# Patient Record
Sex: MALE | Race: WHITE | NOT HISPANIC OR LATINO
[De-identification: names, ages, dates, MRNs, and addresses within clinical notes are randomized per-mention and may not be internally consistent; named-entity substitution may affect disease eponyms.]

---

## 2017-01-17 ENCOUNTER — APPOINTMENT (OUTPATIENT)
Dept: UROLOGY | Facility: CLINIC | Age: 57
End: 2017-01-17

## 2017-01-27 ENCOUNTER — TRANSCRIPTION ENCOUNTER (OUTPATIENT)
Age: 57
End: 2017-01-27

## 2017-02-03 ENCOUNTER — RECORD ABSTRACTING (OUTPATIENT)
Age: 57
End: 2017-02-03

## 2017-02-03 ENCOUNTER — TRANSCRIPTION ENCOUNTER (OUTPATIENT)
Age: 57
End: 2017-02-03

## 2017-02-07 ENCOUNTER — RECORD ABSTRACTING (OUTPATIENT)
Age: 57
End: 2017-02-07

## 2017-02-07 ENCOUNTER — TRANSCRIPTION ENCOUNTER (OUTPATIENT)
Age: 57
End: 2017-02-07

## 2017-03-04 ENCOUNTER — TRANSCRIPTION ENCOUNTER (OUTPATIENT)
Age: 57
End: 2017-03-04

## 2017-03-07 ENCOUNTER — TRANSCRIPTION ENCOUNTER (OUTPATIENT)
Age: 57
End: 2017-03-07

## 2017-03-11 ENCOUNTER — TRANSCRIPTION ENCOUNTER (OUTPATIENT)
Age: 57
End: 2017-03-11

## 2017-03-13 ENCOUNTER — TRANSCRIPTION ENCOUNTER (OUTPATIENT)
Age: 57
End: 2017-03-13

## 2017-03-30 ENCOUNTER — TRANSCRIPTION ENCOUNTER (OUTPATIENT)
Age: 57
End: 2017-03-30

## 2017-03-30 ENCOUNTER — MEDICATION RENEWAL (OUTPATIENT)
Age: 57
End: 2017-03-30

## 2017-05-09 ENCOUNTER — TRANSCRIPTION ENCOUNTER (OUTPATIENT)
Age: 57
End: 2017-05-09

## 2017-05-09 ENCOUNTER — MEDICATION RENEWAL (OUTPATIENT)
Age: 57
End: 2017-05-09

## 2017-05-17 ENCOUNTER — TRANSCRIPTION ENCOUNTER (OUTPATIENT)
Age: 57
End: 2017-05-17

## 2017-06-16 ENCOUNTER — APPOINTMENT (OUTPATIENT)
Dept: NEUROLOGY | Facility: CLINIC | Age: 57
End: 2017-06-16

## 2017-06-16 VITALS
DIASTOLIC BLOOD PRESSURE: 78 MMHG | WEIGHT: 189 LBS | SYSTOLIC BLOOD PRESSURE: 122 MMHG | HEIGHT: 69 IN | BODY MASS INDEX: 27.99 KG/M2 | HEART RATE: 76 BPM

## 2017-06-16 DIAGNOSIS — R42 DIZZINESS AND GIDDINESS: ICD-10-CM

## 2017-07-03 ENCOUNTER — TRANSCRIPTION ENCOUNTER (OUTPATIENT)
Age: 57
End: 2017-07-03

## 2017-07-05 ENCOUNTER — TRANSCRIPTION ENCOUNTER (OUTPATIENT)
Age: 57
End: 2017-07-05

## 2017-07-12 LAB
ALBUMIN SERPL ELPH-MCNC: 4.6 G/DL
ALP BLD-CCNC: 76 U/L
ALT SERPL-CCNC: 26 U/L
ANION GAP SERPL CALC-SCNC: 17 MMOL/L
AST SERPL-CCNC: 21 U/L
BASOPHILS # BLD AUTO: 0.05 K/UL
BASOPHILS NFR BLD AUTO: 0.9 %
BILIRUB SERPL-MCNC: 0.8 MG/DL
BUN SERPL-MCNC: 21 MG/DL
CALCIUM SERPL-MCNC: 10.2 MG/DL
CHLORIDE SERPL-SCNC: 97 MMOL/L
CO2 SERPL-SCNC: 28 MMOL/L
CREAT SERPL-MCNC: 1.25 MG/DL
EOSINOPHIL # BLD AUTO: 0.09 K/UL
EOSINOPHIL NFR BLD AUTO: 1.6 %
GLUCOSE SERPL-MCNC: 124 MG/DL
HCT VFR BLD CALC: 51.1 %
HGB BLD-MCNC: 16.8 G/DL
IMM GRANULOCYTES NFR BLD AUTO: 0.7 %
LYMPHOCYTES # BLD AUTO: 1.34 K/UL
LYMPHOCYTES NFR BLD AUTO: 23.6 %
MAN DIFF?: NORMAL
MCHC RBC-ENTMCNC: 30.1 PG
MCHC RBC-ENTMCNC: 32.9 GM/DL
MCV RBC AUTO: 91.6 FL
MONOCYTES # BLD AUTO: 0.37 K/UL
MONOCYTES NFR BLD AUTO: 6.5 %
NEUTROPHILS # BLD AUTO: 3.8 K/UL
NEUTROPHILS NFR BLD AUTO: 66.7 %
PLATELET # BLD AUTO: 177 K/UL
POTASSIUM SERPL-SCNC: 4.5 MMOL/L
PROT SERPL-MCNC: 8.1 G/DL
RBC # BLD: 5.58 M/UL
RBC # FLD: 13.8 %
SODIUM SERPL-SCNC: 142 MMOL/L
WBC # FLD AUTO: 5.69 K/UL

## 2017-07-13 LAB
PSA SERPL-MCNC: 5.84 NG/ML
TESTOST SERPL-MCNC: 319.5 NG/DL

## 2017-07-28 ENCOUNTER — APPOINTMENT (OUTPATIENT)
Dept: UROLOGY | Facility: CLINIC | Age: 57
End: 2017-07-28
Payer: COMMERCIAL

## 2017-07-28 PROCEDURE — 99214 OFFICE O/P EST MOD 30 MIN: CPT

## 2017-07-31 ENCOUNTER — TRANSCRIPTION ENCOUNTER (OUTPATIENT)
Age: 57
End: 2017-07-31

## 2017-08-01 ENCOUNTER — TRANSCRIPTION ENCOUNTER (OUTPATIENT)
Age: 57
End: 2017-08-01

## 2017-08-01 ENCOUNTER — MEDICATION RENEWAL (OUTPATIENT)
Age: 57
End: 2017-08-01

## 2017-08-07 ENCOUNTER — TRANSCRIPTION ENCOUNTER (OUTPATIENT)
Age: 57
End: 2017-08-07

## 2017-08-08 ENCOUNTER — EMERGENCY (EMERGENCY)
Facility: HOSPITAL | Age: 57
LOS: 0 days | Discharge: HOME | End: 2017-08-08
Attending: EMERGENCY MEDICINE
Payer: OTHER MISCELLANEOUS

## 2017-08-08 ENCOUNTER — TRANSCRIPTION ENCOUNTER (OUTPATIENT)
Age: 57
End: 2017-08-08

## 2017-08-08 VITALS
TEMPERATURE: 98 F | HEART RATE: 88 BPM | WEIGHT: 190.04 LBS | HEIGHT: 69 IN | SYSTOLIC BLOOD PRESSURE: 143 MMHG | RESPIRATION RATE: 18 BRPM | DIASTOLIC BLOOD PRESSURE: 77 MMHG | OXYGEN SATURATION: 98 %

## 2017-08-08 DIAGNOSIS — S81.832A PUNCTURE WOUND WITHOUT FOREIGN BODY, LEFT LOWER LEG, INITIAL ENCOUNTER: ICD-10-CM
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
## 2017-08-08 DIAGNOSIS — N40.0 BENIGN PROSTATIC HYPERPLASIA WITHOUT LOWER URINARY TRACT SYMPTOMS: ICD-10-CM

## 2017-08-08 PROCEDURE — 99283 EMERGENCY DEPT VISIT LOW MDM: CPT

## 2017-08-08 RX ORDER — TAMSULOSIN HYDROCHLORIDE 0.4 MG/1
1 CAPSULE ORAL
Qty: 0 | Refills: 0 | COMMUNITY

## 2017-08-08 NOTE — ED PROVIDER NOTE - CARE PLAN
Principal Discharge DX:	Puncture wound of left lower leg, initial encounter  Secondary Diagnosis:	Dog bite of left calf, initial encounter

## 2017-08-14 ENCOUNTER — MEDICATION RENEWAL (OUTPATIENT)
Age: 57
End: 2017-08-14

## 2017-08-14 ENCOUNTER — TRANSCRIPTION ENCOUNTER (OUTPATIENT)
Age: 57
End: 2017-08-14

## 2017-10-16 ENCOUNTER — MEDICATION RENEWAL (OUTPATIENT)
Age: 57
End: 2017-10-16

## 2017-10-16 ENCOUNTER — TRANSCRIPTION ENCOUNTER (OUTPATIENT)
Age: 57
End: 2017-10-16

## 2017-10-17 ENCOUNTER — TRANSCRIPTION ENCOUNTER (OUTPATIENT)
Age: 57
End: 2017-10-17

## 2017-10-20 ENCOUNTER — TRANSCRIPTION ENCOUNTER (OUTPATIENT)
Age: 57
End: 2017-10-20

## 2017-10-20 RX ORDER — TESTOSTERONE 16.2 MG/G
20.25 MG/ACT GEL TRANSDERMAL
Qty: 1 | Refills: 0 | Status: DISCONTINUED | COMMUNITY
Start: 2017-02-03 | End: 2017-10-20

## 2017-10-23 ENCOUNTER — TRANSCRIPTION ENCOUNTER (OUTPATIENT)
Age: 57
End: 2017-10-23

## 2017-10-24 ENCOUNTER — TRANSCRIPTION ENCOUNTER (OUTPATIENT)
Age: 57
End: 2017-10-24

## 2018-01-04 ENCOUNTER — TRANSCRIPTION ENCOUNTER (OUTPATIENT)
Age: 58
End: 2018-01-04

## 2018-01-05 ENCOUNTER — MEDICATION RENEWAL (OUTPATIENT)
Age: 58
End: 2018-01-05

## 2018-01-08 ENCOUNTER — CLINICAL ADVICE (OUTPATIENT)
Age: 58
End: 2018-01-08

## 2018-01-08 LAB
ALBUMIN SERPL ELPH-MCNC: 4.5 G/DL
ALP BLD-CCNC: 72 U/L
ALT SERPL-CCNC: 31 U/L
AST SERPL-CCNC: 25 U/L
BASOPHILS # BLD AUTO: 0.03 K/UL
BASOPHILS NFR BLD AUTO: 0.5 %
BILIRUB SERPL-MCNC: 0.5 MG/DL
BUN SERPL-MCNC: 25 MG/DL
CALCIUM SERPL-MCNC: 10.2 MG/DL
CHLORIDE SERPL-SCNC: 99 MMOL/L
CO2 SERPL-SCNC: 28 MMOL/L
CREAT SERPL-MCNC: 1.27 MG/DL
EOSINOPHIL # BLD AUTO: 0.08 K/UL
EOSINOPHIL NFR BLD AUTO: 1.3 %
GLUCOSE SERPL-MCNC: 88 MG/DL
HCT VFR BLD CALC: 50.1 %
HGB BLD-MCNC: 16.6 G/DL
IMM GRANULOCYTES NFR BLD AUTO: 0.6 %
LYMPHOCYTES # BLD AUTO: 1.38 K/UL
LYMPHOCYTES NFR BLD AUTO: 22.2 %
MAN DIFF?: NORMAL
MCHC RBC-ENTMCNC: 30.5 PG
MCHC RBC-ENTMCNC: 33.1 GM/DL
MCV RBC AUTO: 92.1 FL
MONOCYTES # BLD AUTO: 0.49 K/UL
MONOCYTES NFR BLD AUTO: 7.9 %
NEUTROPHILS # BLD AUTO: 4.21 K/UL
NEUTROPHILS NFR BLD AUTO: 67.5 %
PLATELET # BLD AUTO: 174 K/UL
POTASSIUM SERPL-SCNC: 4.8 MMOL/L
PROT SERPL-MCNC: 7.9 G/DL
PSA SERPL-MCNC: 4.97 NG/ML
RBC # BLD: 5.44 M/UL
RBC # FLD: 14.1 %
SODIUM SERPL-SCNC: 142 MMOL/L
TESTOST SERPL-MCNC: 922.9 NG/DL
WBC # FLD AUTO: 6.23 K/UL

## 2018-01-12 ENCOUNTER — APPOINTMENT (OUTPATIENT)
Dept: UROLOGY | Facility: CLINIC | Age: 58
End: 2018-01-12
Payer: COMMERCIAL

## 2018-01-12 PROCEDURE — 99214 OFFICE O/P EST MOD 30 MIN: CPT

## 2018-01-12 RX ORDER — SODIUM SULFACETAMIDE 10% AND SULFUR 5% EMOLLIENT CREAM 100; 50 MG/G; MG/G
10-5 CREAM TOPICAL
Qty: 57 | Refills: 0 | Status: DISCONTINUED | COMMUNITY
Start: 2017-08-07 | End: 2018-01-12

## 2018-01-12 RX ORDER — SODIUM SULFACETAMIDE AND SULFUR 80; 40 MG/ML; MG/ML
8-4 LOTION TOPICAL
Qty: 473 | Refills: 0 | Status: DISCONTINUED | COMMUNITY
Start: 2017-12-25 | End: 2018-01-12

## 2018-01-12 RX ORDER — TOBRAMYCIN AND DEXAMETHASONE 3; 1 MG/ML; MG/ML
0.3-0.1 SUSPENSION/ DROPS OPHTHALMIC
Qty: 5 | Refills: 0 | Status: COMPLETED | COMMUNITY
Start: 2017-11-21 | End: 2018-01-12

## 2018-01-12 RX ORDER — SODIUM SULFATE, POTASSIUM SULFATE, MAGNESIUM SULFATE 17.5; 3.13; 1.6 G/ML; G/ML; G/ML
17.5-3.13-1.6 SOLUTION, CONCENTRATE ORAL
Qty: 354 | Refills: 0 | Status: COMPLETED | COMMUNITY
Start: 2017-07-12 | End: 2018-01-12

## 2018-01-16 ENCOUNTER — TRANSCRIPTION ENCOUNTER (OUTPATIENT)
Age: 58
End: 2018-01-16

## 2018-01-17 ENCOUNTER — MEDICATION RENEWAL (OUTPATIENT)
Age: 58
End: 2018-01-17

## 2018-01-17 DIAGNOSIS — F41.9 ANXIETY DISORDER, UNSPECIFIED: ICD-10-CM

## 2018-02-09 ENCOUNTER — TRANSCRIPTION ENCOUNTER (OUTPATIENT)
Age: 58
End: 2018-02-09

## 2018-02-12 ENCOUNTER — TRANSCRIPTION ENCOUNTER (OUTPATIENT)
Age: 58
End: 2018-02-12

## 2018-02-23 LAB
BASOPHILS # BLD AUTO: 0.02 K/UL
BASOPHILS NFR BLD AUTO: 0.3 %
EOSINOPHIL # BLD AUTO: 0.07 K/UL
EOSINOPHIL NFR BLD AUTO: 1 %
HCT VFR BLD CALC: 48.9 %
HGB BLD-MCNC: 16.2 G/DL
IMM GRANULOCYTES NFR BLD AUTO: 0.6 %
LYMPHOCYTES # BLD AUTO: 2.1 K/UL
LYMPHOCYTES NFR BLD AUTO: 29 %
MAN DIFF?: NORMAL
MCHC RBC-ENTMCNC: 29.9 PG
MCHC RBC-ENTMCNC: 33.1 GM/DL
MCV RBC AUTO: 90.4 FL
MONOCYTES # BLD AUTO: 0.52 K/UL
MONOCYTES NFR BLD AUTO: 7.2 %
NEUTROPHILS # BLD AUTO: 4.5 K/UL
NEUTROPHILS NFR BLD AUTO: 61.9 %
PLATELET # BLD AUTO: 153 K/UL
PSA SERPL-MCNC: 4.25 NG/ML
RBC # BLD: 5.41 M/UL
RBC # FLD: 13.3 %
WBC # FLD AUTO: 7.25 K/UL

## 2018-02-26 LAB
TESTOST BND SERPL-MCNC: 14.9 PG/ML
TESTOST SERPL-MCNC: 371.7 NG/DL

## 2018-02-28 LAB
TESTOST BND SERPL-MCNC: 9.45 NG/DL
TESTOSTERONE BIOAVAILABLE: 137 NG/DL
TESTOSTERONE TOTAL S: 350 NG/DL

## 2018-03-20 ENCOUNTER — APPOINTMENT (OUTPATIENT)
Dept: UROLOGY | Facility: CLINIC | Age: 58
End: 2018-03-20
Payer: COMMERCIAL

## 2018-03-20 PROCEDURE — 99213 OFFICE O/P EST LOW 20 MIN: CPT

## 2018-04-20 ENCOUNTER — MEDICATION RENEWAL (OUTPATIENT)
Age: 58
End: 2018-04-20

## 2018-04-20 ENCOUNTER — TRANSCRIPTION ENCOUNTER (OUTPATIENT)
Age: 58
End: 2018-04-20

## 2018-04-23 ENCOUNTER — TRANSCRIPTION ENCOUNTER (OUTPATIENT)
Age: 58
End: 2018-04-23

## 2018-04-30 ENCOUNTER — TRANSCRIPTION ENCOUNTER (OUTPATIENT)
Age: 58
End: 2018-04-30

## 2018-06-04 ENCOUNTER — TRANSCRIPTION ENCOUNTER (OUTPATIENT)
Age: 58
End: 2018-06-04

## 2018-06-04 ENCOUNTER — MEDICATION RENEWAL (OUTPATIENT)
Age: 58
End: 2018-06-04

## 2018-06-05 ENCOUNTER — TRANSCRIPTION ENCOUNTER (OUTPATIENT)
Age: 58
End: 2018-06-05

## 2018-06-15 ENCOUNTER — EMERGENCY (EMERGENCY)
Facility: HOSPITAL | Age: 58
LOS: 0 days | Discharge: HOME | End: 2018-06-15
Attending: EMERGENCY MEDICINE
Payer: OTHER MISCELLANEOUS

## 2018-06-15 VITALS
TEMPERATURE: 98 F | DIASTOLIC BLOOD PRESSURE: 74 MMHG | RESPIRATION RATE: 16 BRPM | HEART RATE: 80 BPM | HEIGHT: 69 IN | SYSTOLIC BLOOD PRESSURE: 127 MMHG | OXYGEN SATURATION: 98 % | WEIGHT: 186.95 LBS
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
## 2018-06-15 DIAGNOSIS — S81.851A OPEN BITE, RIGHT LOWER LEG, INITIAL ENCOUNTER: ICD-10-CM
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
## 2018-06-15 PROCEDURE — 99284 EMERGENCY DEPT VISIT MOD MDM: CPT

## 2018-06-15 RX ORDER — IBUPROFEN 200 MG
600 TABLET ORAL ONCE
Qty: 0 | Refills: 0 | Status: COMPLETED | OUTPATIENT
Start: 2018-06-15 | End: 2018-06-15

## 2018-06-15 RX ADMIN — Medication 1 TABLET(S): at 14:53

## 2018-06-15 RX ADMIN — Medication 600 MILLIGRAM(S): at 14:54

## 2018-06-15 RX ADMIN — Medication 600 MILLIGRAM(S): at 15:05

## 2018-06-15 NOTE — ED PROVIDER NOTE - PHYSICAL EXAMINATION
Gen: Alert, NAD  Head: NC, AT   Eyes: PERRL, EOMI, normal lids/conjunctiva  ENT: normal hearing, patent oropharynx without erythema/exudate, uvula midline  Neck: supple, no tenderness, Trachea midline  Pulm: Bilateral BS, normal resp effort, no wheeze/stridor/retractions  CV: RRR, no M/R/G, 2+ radial and dp pulses bl, no edema  Abd: soft, NT/ND, +BS, no hepatosplenomegaly  Mskel: extremities x4 with normal ROM and no joint effusions. no ctl spine ttp.   Skin: small puncture marks and abrasions to the right lateral leg  Neuro: AAOx3, no sensory/motor deficits, CN 2-12 intact

## 2018-06-15 NOTE — ED PROVIDER NOTE - OBJECTIVE STATEMENT
Pertinent PMH/PSH/FHx/SHx and Review of Systems contained within:  57m no relevant med hx pw dog bite. 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 it was a pet and had been vaccinated against rabies. patient notes mild pain and no bleeding to the site. no numbness, tingling, discharge or leg pain  Fh and Sh not otherwise contributory  ROS otherwise negative

## 2018-06-15 NOTE — ED ADULT NURSE NOTE - OBJECTIVE STATEMENT
Pt with dog rissae right lower outer leg. Dog unknown to him. He is a mailman delivering mail Multiple punctures wounds to right outer lower leg with abrasion.   Evaluated by Dr Ferguson and the wound cleaned

## 2018-06-15 NOTE — ED ADULT TRIAGE NOTE - BP NONINVASIVE DIASTOLIC (MM HG)
<SusanaJovany crowley - Last Filed: 03/22/17 15:47>





Subjective





- Date & Time of Evaluation


Date of Evaluation: 03/22/17


Time of Evaluation: 07:45





- Subjective


Subjective: 


Medicine Progress note. Dr. Hughes





Pt seen and examined at bedside. No acute events overnight. No F/C. No CP/SOB. 

No new complaints. 





12:00


Patient was ambulating around the simeon and had another episode of tight chest 

pain, described as pressure. STAT EKG with no changes. Troponin negative x1. BP 

was elevated to 180s systolic. Hydralizine 10mg IVP given. Continue monitoring 

patient on Tele





Objective





- Vital Signs/Intake and Output


Vital Signs (last 24 hours): 


 











Temp Pulse Resp BP Pulse Ox


 


 98.0 F   66   16   151/72 H  99 


 


 03/22/17 15:44  03/22/17 15:44  03/22/17 15:44  03/22/17 15:44  03/22/17 06:00








Intake and Output: 


 











 03/22/17 03/22/17





 06:59 18:59


 


Intake Total 1095 925


 


Output Total 1700 


 


Balance -605 925














- Medications


Medications: 


 Current Medications





Aspirin (Ecotrin)  81 mg PO DAILY On license of [facility]


   Last Admin: 03/22/17 09:54 Dose:  81 mg


Insulin Human Regular (Humulin R Med)  0 units SC ACHS On license of [facility]


   PRN Reason: Protocol


   Last Admin: 03/22/17 12:38 Dose:  Not Given


Lisinopril (Zestril)  10 mg PO DAILY On license of [facility]


   Last Admin: 03/22/17 13:54 Dose:  10 mg


Ondansetron HCl (Zofran Inj)  4 mg IVP Q6H PRN


   PRN Reason: Nausea/Vomiting


Pantoprazole Sodium (Protonix Ec Tab)  40 mg PO DAILY On license of [facility]


   Last Admin: 03/22/17 09:53 Dose:  40 mg











- Labs


Labs: 


 





 03/22/17 07:00 





 03/22/17 07:00 





 











PT  11.7 Seconds (9.9-11.8)   03/21/17  06:20    


 


INR  1.08  (0.93-1.08)   03/21/17  06:20    


 


APTT  24.0 Seconds (23.7-30.8)   03/21/17  06:20    














- Constitutional


Appears: Well, No Acute Distress





- Head Exam


Head Exam: ATRAUMATIC, NORMAL INSPECTION, NORMOCEPHALIC





- Eye Exam


Eye Exam: EOMI, Normal appearance, PERRL.  absent: Scleral icterus


Pupil Exam: PERRL





- ENT Exam


ENT Exam: Mucous Membranes Moist, Normal Exam





- Neck Exam


Neck Exam: Full ROM, Normal Inspection





- Respiratory Exam


Respiratory Exam: Clear to Ausculation Bilateral, NORMAL BREATHING PATTERN.  

absent: Wheezes





- Cardiovascular Exam


Cardiovascular Exam: REGULAR RHYTHM, RRR, +S1, +S2.  absent: JVD





- GI/Abdominal Exam


GI & Abdominal Exam: Soft, Normal Bowel Sounds.  absent: Tenderness





- Extremities Exam


Extremities Exam: Normal Inspection





- Back Exam


Back Exam: NORMAL INSPECTION





- Neurological Exam


Neurological Exam: Alert, Awake, Normal Gait, Oriented x3





- Psychiatric Exam


Psychiatric exam: Normal Affect, Normal Mood





- Skin


Skin Exam: Dry, Intact, Normal Color, Warm





Assessment and Plan





- Assessment and Plan (Free Text)


Assessment: 


64yo M with PMHx of CAD, HTN, DM, Asthma here for evaluation of Bradycardia, Pre

-syncope, resolved. Patient also with symptomatic anemia. 





1. Pre-syncope; Atypical Chest pain


Recurrent episode while ambulating on hospital [facility] today afternoon (3/22)


normotensive


Consider medication (Metoprolol) Toxicity


Hold metoprolol


CXR - no active disease


on admission, EKG - Eliel @ 40; Possible 3rd degree heart block present. Few 

missed p-waves. No ST changes


Repeat EKG - NSR


BNP 800s


   Hold IVF


Cardiology Consulted, Dr. Cortés, appreciate recs


TSH wnl


Utox negative


Troponins negative x3. 


   Will require Repeat Troponins x3 following secondary episode of chest pain 

today.


Urine and Blood Cxs NGTD


UA negative


ECHO - LVEF 75%


ASA 81mg PO Daily





2. RUQ Abd pain; Elevated Transaminases


Resolved


Leukocytosis improved


Afebrile


LFTs increased today, consider transient hypotension as cause


Abd US - fatty liver infiltration. No gallstones. Mild pericholecystic fluid.


Procalcitonin negative


Advance Diet as tolerated


Zofran 4mg q6 prn


GI consulted, Dr. Collins


   consider secondary to muscle leak following transcutaneous pacing.


   f/u HCV serology


   F/u with primary GI at [facility] as out-patient


Discontinue Tylenol





3. Anemia


Hb 9.5 on admission, 8.5 today


will transfuse 2u pRBCs today


Repeat CBC in AM


consider cause of pre-syncope/Fatigue





4. Constipation


Miralax ordered


will continue to monitor





5. Hx of DM


Insulin Medium dose SS


Accuchecks


Hold home metformin





6. Hx of HTN


Continue to monitor


Hold metoprolol secondary to above


Patient will need to follow up with his PMD for BP monitoring and Medication 

adjustments





7. PPx


SCDs


Protonix 40mg PO Daily





Discussed case with Dr. Ellen Meza PGY1


[phone]





<Ellen RILEY,Hanna - Last Filed: 03/22/17 16:34>





Objective





- Vital Signs/Intake and Output


Vital Signs (last 24 hours): 


 











Temp Pulse Resp BP Pulse Ox


 


 98.0 F   66   16   151/72 H  99 


 


 03/22/17 15:44  03/22/17 15:44  03/22/17 15:44  03/22/17 15:44  03/22/17 06:00








Intake and Output: 


 











 03/22/17 03/22/17





 06:59 18:59


 


Intake Total 1095 925


 


Output Total 1700 


 


Balance -605 925














- Medications


Medications: 


 Current Medications





Aspirin (Ecotrin)  81 mg PO DAILY On license of [facility]


   Last Admin: 03/22/17 09:54 Dose:  81 mg


Insulin Human Regular (Humulin R Med)  0 units SC ACHS On license of [facility]


   PRN Reason: Protocol


   Last Admin: 03/22/17 12:38 Dose:  Not Given


Lisinopril (Zestril)  10 mg PO DAILY On license of [facility]


   Last Admin: 03/22/17 13:54 Dose:  10 mg


Ondansetron HCl (Zofran Inj)  4 mg IVP Q6H PRN


   PRN Reason: Nausea/Vomiting


Pantoprazole Sodium (Protonix Ec Tab)  40 mg PO DAILY On license of [facility]


   Last Admin: 03/22/17 09:53 Dose:  40 mg











- Labs


Labs: 


 





 03/22/17 07:00 





 03/22/17 07:00 





 











PT  11.7 Seconds (9.9-11.8)   03/21/17  06:20    


 


INR  1.08  (0.93-1.08)   03/21/17  06:20    


 


APTT  24.0 Seconds (23.7-30.8)   03/21/17  06:20    














Attending/Attestation





- Attestation


I have personally seen and examined this patient.: Yes


I have fully participated in the care of the patient.: Yes


I have reviewed all pertinent clinical information, including history, physical 

exam and plan: Yes


Notes (Text): 





Patient was seen and examined with medical resident .Agreed with resident 

assessment and plan.





Patient develop chest pain while walking, improved with  rest,EKG is  NSR,RBBB, 

no new changes, patient symptom is due to symptometic anemia, will transfuse  

PRBC, there is no active bleeding, will get troponin.


Patient bradycardia has improved.


Blood pressure is running high, will start on Lisinopril.


Patient Transaminase levels are high, will monitor, as per GI this is due to 

musscle leak from pacemaker


Management plan was discussed in detail with patient


 Education was provided.
Addendum entered and electronically signed by Jovany Meza DO  03/21/17 10:10

: 





Heparin 5000U SC q12 not started. Continue SCDs





Original Note:








<Jovany Meza - Last Filed: 03/21/17 10:04>





Subjective





- Date & Time of Evaluation


Date of Evaluation: 03/21/17


Time of Evaluation: 09:40





- Subjective


Subjective: 


Medicine Progress note. Dr. Hughes





Pt seen and examined at bedside. No acute events overnight. Patient states that 

his chest pain has improved. He denies any more abdominal pain. No N/V/D. No 

new complaints. 





Overnight, he was found to be hyperkalemic and was given Kayexalate, D50, 

Insulin and Albuterol. No EKG changes were observed. Patient remained 

asymptomatic. 





Objective





- Vital Signs/Intake and Output


Vital Signs (last 24 hours): 


 











Temp Pulse Resp BP Pulse Ox


 


 98.4 F   87   16   135/63   100 


 


 03/21/17 04:00  03/21/17 08:00  03/21/17 08:00  03/21/17 08:00  03/21/17 08:00








Intake and Output: 


 











 03/21/17 03/21/17





 06:59 18:59


 


Intake Total 2620 


 


Output Total 1500 


 


Balance 1120 














- Medications


Medications: 


 Current Medications





Acetaminophen (Tylenol 325mg Tab)  650 mg PO Q6H PRN


   PRN Reason: Pain, Mild (1-3)


Albuterol Sulfate (Albuterol 0.5% Inhal Sol (2.5 Mg/0.5 Ml) Ud)  10 mg IH ONCE 

ONE


   Stop: 03/21/17 22:49


Aspirin (Ecotrin)  81 mg PO DAILY [facility]


Vancomycin HCl (Vancomycin 1gm)  250 mls @ 167 mls/hr IVPB DAILY [facility]


   PRN Reason: Protocol


Insulin Human Regular (Humulin R Med)  0 units SC ACHS [facility]


   PRN Reason: Protocol


   Last Admin: 03/20/17 22:00 Dose:  Not Given


Ondansetron HCl (Zofran Inj)  4 mg IVP Q6H PRN


   PRN Reason: Nausea/Vomiting


Pantoprazole Sodium (Protonix Ec Tab)  40 mg PO DAILY [facility]











- Labs


Labs: 


 





 03/21/17 06:20 





 03/21/17 06:20 





 











PT  11.7 Seconds (9.9-11.8)   03/21/17  06:20    


 


INR  1.08  (0.93-1.08)   03/21/17  06:20    


 


APTT  24.0 Seconds (23.7-30.8)   03/21/17  06:20    














- Constitutional


Appears: Well, No Acute Distress





- Head Exam


Head Exam: ATRAUMATIC, NORMAL INSPECTION, NORMOCEPHALIC





- Eye Exam


Eye Exam: EOMI, Normal appearance, PERRL.  absent: Scleral icterus


Pupil Exam: PERRL





- ENT Exam


ENT Exam: Mucous Membranes Moist, Normal Exam





- Neck Exam


Neck Exam: Full ROM





- Respiratory Exam


Respiratory Exam: Clear to Ausculation Bilateral, NORMAL BREATHING PATTERN.  

absent: Wheezes





- Cardiovascular Exam


Cardiovascular Exam: REGULAR RHYTHM, RRR, +S1, +S2.  absent: JVD





- GI/Abdominal Exam


GI & Abdominal Exam: Soft, Normal Bowel Sounds.  absent: Distended, Guarding, 

Tenderness, Rebound





- Extremities Exam


Extremities Exam: Normal Inspection.  absent: Calf Tenderness





- Back Exam


Back Exam: NORMAL INSPECTION





- Neurological Exam


Neurological Exam: Alert, Awake, CN II-XII Intact, Oriented x3





- Psychiatric Exam


Psychiatric exam: Normal Affect, Normal Mood





- Skin


Skin Exam: Dry, Intact, Normal Color, Warm





Assessment and Plan





- Assessment and Plan (Free Text)


Assessment: 


64yo M with PMHx of CAD, HTN, DM, Asthma here for evaluation of Bradycardia, Pre

-syncope





1. Pre-syncope; Atypical Chest pain


Resolved


Denies any trauma


likely secondary to Bradycardia


normotensive


Consider medication (Metoprolol) Toxicity


Hold metoprolol


CXR - no active disease


EKG - Eliel @ 40; Possible 3rd degree heart block present. Few missed p-waves. 

No ST changes


f/u Repeat EKG


BNP 800s


   Hold IVF


Cardiology Consulted, Dr. Cortés, appreciate recs


TSH wnl


Utox negative


Troponins negative x3


Urine and Blood Cxs NGTD


UA negative


f/u ECHO


ASA 81mg PO Daily


Tylenol 650mg PO q6 prn pain mild





2. RUQ Abd pain


Resolved


Leukocytosis


Afebrile


LFTs increased today, consider transient hypotension as cause


Abd US - fatty liver infiltration. No gallstones. Mild pericholecystic fluid.


f/u Procalcitonin


Advance Diet as tolerated


Zofran 4mg q6 prn





3. Hx of DM


Insulin Medium dose SS


Accuchecks


Hold home metformin





4. Hx of HTN


Continue to monitor


Hold metoprolol secondary to above


Patient will need to follow up with his PMD for BP monitoring and Medication 

adjustments





5. PPx


SCDs


Heparin 5000U SC q12


Protonix 40mg PO Daily





Discussed case with Dr. Ellen Meza PGY1


[phone]





<Hanna Hughes MD - Last Filed: 03/21/17 16:40>





Objective





- Vital Signs/Intake and Output


Vital Signs (last 24 hours): 


 











Temp Pulse Resp BP Pulse Ox


 


 98.4 F   87   16   135/63   100 


 


 03/21/17 04:00  03/21/17 08:00  03/21/17 08:00  03/21/17 08:00  03/21/17 08:00








Intake and Output: 


 











 03/21/17 03/21/17





 06:59 18:59


 


Intake Total 2620 


 


Output Total 1500 


 


Balance 1120 














- Medications


Medications: 


 Current Medications





Acetaminophen (Tylenol 325mg Tab)  650 mg PO Q6H PRN


   PRN Reason: Pain, Mild (1-3)


Albuterol Sulfate (Albuterol 0.5% Inhal Sol (2.5 Mg/0.5 Ml) Ud)  10 mg IH ONCE 

ONE


   Stop: 03/21/17 22:49


Aspirin (Ecotrin)  81 mg PO DAILY Counts include 234 beds at the [facility]


Vancomycin HCl (Vancomycin 1gm)  250 mls @ 167 mls/hr IVPB DAILY [facility]


   PRN Reason: Protocol


   Last Admin: 03/21/17 10:57 Dose:  167 mls/hr


Insulin Human Regular (Humulin R Med)  0 units SC ACHS [facility]


   PRN Reason: Protocol


   Last Admin: 03/20/17 22:00 Dose:  Not Given


Ondansetron HCl (Zofran Inj)  4 mg IVP Q6H PRN


   PRN Reason: Nausea/Vomiting


Pantoprazole Sodium (Protonix Ec Tab)  40 mg PO DAILY Counts include 234 beds at the [facility]


   Last Admin: 03/21/17 10:56 Dose:  40 mg











- Labs


Labs: 


 





 03/21/17 06:20 





 03/21/17 06:20 





 











PT  11.7 Seconds (9.9-11.8)   03/21/17  06:20    


 


INR  1.08  (0.93-1.08)   03/21/17  06:20    


 


APTT  24.0 Seconds (23.7-30.8)   03/21/17  06:20    














Attending/Attestation





- Attestation


I have personally seen and examined this patient.: Yes


I have fully participated in the care of the patient.: Yes


I have reviewed all pertinent clinical information, including history, physical 

exam and plan: Yes


Notes (Text): 





Patient was seen and examined with medical resident .Agreed with resident 

assessment and plan.








Patient bradycardia has improved.He is NSR .His symptoms are resolved.His 

bradycardia was due to Metoprolol.Echo showed EF >50% and grade I diastolic 

dysfunction.Cardiology is following.


Right upper quadrant pain and tenderness has improved.


Patient transaminase level are elevated today, will monitor, etiology could be 

due to medication.


Management plan was discussed in detail with patient


 Education was provided.
74

## 2018-07-17 ENCOUNTER — MEDICATION RENEWAL (OUTPATIENT)
Age: 58
End: 2018-07-17

## 2018-07-17 ENCOUNTER — TRANSCRIPTION ENCOUNTER (OUTPATIENT)
Age: 58
End: 2018-07-17

## 2018-07-19 ENCOUNTER — TRANSCRIPTION ENCOUNTER (OUTPATIENT)
Age: 58
End: 2018-07-19

## 2018-08-07 ENCOUNTER — TRANSCRIPTION ENCOUNTER (OUTPATIENT)
Age: 58
End: 2018-08-07

## 2018-08-08 ENCOUNTER — TRANSCRIPTION ENCOUNTER (OUTPATIENT)
Age: 58
End: 2018-08-08

## 2018-08-08 ENCOUNTER — MEDICATION RENEWAL (OUTPATIENT)
Age: 58
End: 2018-08-08

## 2018-09-25 LAB
ALBUMIN SERPL ELPH-MCNC: 4.7 G/DL
ALP BLD-CCNC: 73 U/L
ALT SERPL-CCNC: 46 U/L
ANION GAP SERPL CALC-SCNC: 17 MMOL/L
AST SERPL-CCNC: 25 U/L
BASOPHILS # BLD AUTO: 0.07 K/UL
BASOPHILS NFR BLD AUTO: 1.1 %
BILIRUB SERPL-MCNC: 0.5 MG/DL
BUN SERPL-MCNC: 17 MG/DL
CALCIUM SERPL-MCNC: 10.2 MG/DL
CHLORIDE SERPL-SCNC: 96 MMOL/L
CO2 SERPL-SCNC: 31 MMOL/L
CREAT SERPL-MCNC: 1.16 MG/DL
EOSINOPHIL # BLD AUTO: 0.18 K/UL
EOSINOPHIL NFR BLD AUTO: 2.8 %
GLUCOSE SERPL-MCNC: 58 MG/DL
HCT VFR BLD CALC: 48.5 %
HGB BLD-MCNC: 16.8 G/DL
IMM GRANULOCYTES NFR BLD AUTO: 3.1 %
LYMPHOCYTES # BLD AUTO: 1.67 K/UL
LYMPHOCYTES NFR BLD AUTO: 25.8 %
MAN DIFF?: NORMAL
MCHC RBC-ENTMCNC: 31.3 PG
MCHC RBC-ENTMCNC: 34.6 GM/DL
MCV RBC AUTO: 90.5 FL
MONOCYTES # BLD AUTO: 0.66 K/UL
MONOCYTES NFR BLD AUTO: 10.2 %
NEUTROPHILS # BLD AUTO: 3.69 K/UL
NEUTROPHILS NFR BLD AUTO: 57 %
PLATELET # BLD AUTO: 157 K/UL
POTASSIUM SERPL-SCNC: 5 MMOL/L
PROT SERPL-MCNC: 8 G/DL
PSA FREE FLD-MCNC: 25.9
PSA FREE SERPL-MCNC: 0.9 NG/ML
PSA SERPL-MCNC: 3.48 NG/ML
RBC # BLD: 5.36 M/UL
RBC # FLD: 14.1 %
SODIUM SERPL-SCNC: 144 MMOL/L
WBC # FLD AUTO: 6.47 K/UL

## 2018-09-28 ENCOUNTER — APPOINTMENT (OUTPATIENT)
Dept: UROLOGY | Facility: CLINIC | Age: 58
End: 2018-09-28
Payer: COMMERCIAL

## 2018-09-28 PROBLEM — E29.1 TESTICULAR HYPOFUNCTION: Chronic | Status: ACTIVE | Noted: 2017-08-08

## 2018-09-28 PROBLEM — N40.0 BENIGN PROSTATIC HYPERPLASIA WITHOUT LOWER URINARY TRACT SYMPTOMS: Chronic | Status: ACTIVE | Noted: 2017-08-08

## 2018-09-28 PROCEDURE — 99212 OFFICE O/P EST SF 10 MIN: CPT

## 2018-10-01 LAB
TESTOST BND SERPL-MCNC: 16.3 PG/ML
TESTOST SERPL-MCNC: 380.3 NG/DL

## 2018-11-09 ENCOUNTER — TRANSCRIPTION ENCOUNTER (OUTPATIENT)
Age: 58
End: 2018-11-09

## 2018-12-05 ENCOUNTER — MEDICATION RENEWAL (OUTPATIENT)
Age: 58
End: 2018-12-05

## 2018-12-05 ENCOUNTER — TRANSCRIPTION ENCOUNTER (OUTPATIENT)
Age: 58
End: 2018-12-05

## 2018-12-14 ENCOUNTER — TRANSCRIPTION ENCOUNTER (OUTPATIENT)
Age: 58
End: 2018-12-14

## 2018-12-18 NOTE — ED ADULT NURSE NOTE - CADM POA PRESS ULCER
[facility]    Discharge Summary    Rickie Wood.  Patient Status:  Observation    1926 MRN [id number]   Location [facility] 3W/SW Attending No att. providers found   [facility] Day # 1 PCP Tahmina Villagomez MD     Date of Admi cyanosis  NEUROLOGICAL:  There was no focal deficit. Cranial nerves II through XII were intact. History of Present Illness:   82year old male. He has a pmh of htn, transient global amnesia, hl, hypothyroidism who is very active swimming 4 x weekly. [address]  [phone]                   Discharge Condition: Stable    Discharge Medications:      Discharge Medications      START taking these medications      Instructions Prescription details   DilTIAZem HCl ER Coated Beads 120 MG Cp24  Commonly known as No

## 2019-03-03 ENCOUNTER — TRANSCRIPTION ENCOUNTER (OUTPATIENT)
Age: 59
End: 2019-03-03

## 2019-03-04 ENCOUNTER — APPOINTMENT (OUTPATIENT)
Dept: UROLOGY | Facility: CLINIC | Age: 59
End: 2019-03-04
Payer: COMMERCIAL

## 2019-03-04 DIAGNOSIS — R94.5 ABNORMAL RESULTS OF LIVER FUNCTION STUDIES: ICD-10-CM

## 2019-03-04 LAB
ALBUMIN SERPL ELPH-MCNC: 4.8 G/DL
ALP BLD-CCNC: 74 U/L
ALT SERPL-CCNC: 90 U/L
ANION GAP SERPL CALC-SCNC: 15 MMOL/L
AST SERPL-CCNC: 45 U/L
BASOPHILS # BLD AUTO: 0.06 K/UL
BASOPHILS NFR BLD AUTO: 1 %
BILIRUB SERPL-MCNC: 0.6 MG/DL
BUN SERPL-MCNC: 17 MG/DL
CALCIUM SERPL-MCNC: 10 MG/DL
CHLORIDE SERPL-SCNC: 98 MMOL/L
CO2 SERPL-SCNC: 25 MMOL/L
CREAT SERPL-MCNC: 1.26 MG/DL
EOSINOPHIL # BLD AUTO: 0.1 K/UL
EOSINOPHIL NFR BLD AUTO: 1.7 %
GLUCOSE SERPL-MCNC: 86 MG/DL
HCT VFR BLD CALC: 51.9 %
HGB BLD-MCNC: 17.1 G/DL
IMM GRANULOCYTES NFR BLD AUTO: 0.8 %
LYMPHOCYTES # BLD AUTO: 1.64 K/UL
LYMPHOCYTES NFR BLD AUTO: 27.2 %
MAN DIFF?: NORMAL
MCHC RBC-ENTMCNC: 30.4 PG
MCHC RBC-ENTMCNC: 32.9 GM/DL
MCV RBC AUTO: 92.3 FL
MONOCYTES # BLD AUTO: 0.61 K/UL
MONOCYTES NFR BLD AUTO: 10.1 %
NEUTROPHILS # BLD AUTO: 3.58 K/UL
NEUTROPHILS NFR BLD AUTO: 59.2 %
PLATELET # BLD AUTO: 187 K/UL
POTASSIUM SERPL-SCNC: 4.8 MMOL/L
PROT SERPL-MCNC: 7.7 G/DL
PSA SERPL-MCNC: 4.46 NG/ML
RBC # BLD: 5.62 M/UL
RBC # FLD: 13.1 %
SODIUM SERPL-SCNC: 138 MMOL/L
WBC # FLD AUTO: 6.04 K/UL

## 2019-03-04 PROCEDURE — 99214 OFFICE O/P EST MOD 30 MIN: CPT

## 2019-03-04 NOTE — ASSESSMENT
[FreeTextEntry1] : --Pt to call back to review T. Needs eval for abnormal LFTs and may be T replacement vs crestor. \par --Will adjust med vs refer to endo if T is normal\par --Cont cialis daily\par

## 2019-03-04 NOTE — PHYSICAL EXAM
[General Appearance - Well Developed] : well developed [General Appearance - Well Nourished] : well nourished [General Appearance - In No Acute Distress] : no acute distress [Abdomen Soft] : soft [Abdomen Tenderness] : non-tender [Costovertebral Angle Tenderness] : no ~M costovertebral angle tenderness [Urethral Meatus] : meatus normal [Penis Abnormality] : normal circumcised penis [Epididymis] : the epididymides were normal [Testes Tenderness] : no tenderness of the testes [Testes Mass (___cm)] : there were no testicular masses [Skin Color & Pigmentation] : normal skin color and pigmentation [Edema] : no peripheral edema [] : no respiratory distress [Oriented To Time, Place, And Person] : oriented to person, place, and time [Normal Station and Gait] : the gait and station were normal for the patient's age [No Focal Deficits] : no focal deficits [Cervical Lymph Nodes Enlarged Anterior Bilaterally] : anterior cervical [Supraclavicular Lymph Nodes Enlarged Bilaterally] : supraclavicular [FreeTextEntry1] : 40cc prostate, smooth, no nodules

## 2019-03-04 NOTE — HISTORY OF PRESENT ILLNESS
[FreeTextEntry1] : 59yo gentleman with cc of hypogonadism. Pt has been seen in the past by Dr Sherwood. Originally seen over 2y ago. Had been seen by  prior and was told he was hypogonadal. Has ED as well managed with cilais daily. Helps with his BPH sx of urinary frequency improved with meds. Pts T is managed with androgel 1 pumps BID. \par \par PSAs have been in the 4-5 range. Has hx of elevated PSA. Had negative prostate biopsy 4/2016. No family hx of prostate ca. PSA today stable at 4.46. \par \par LFTs appear to be rising with AST/ALT now 90/45 (from 46/25). Three months ago injured his foot and has been out of work ([employer]) for 3mo and has gained weight. Now started on crestor.

## 2019-03-05 LAB
TESTOST BND SERPL-MCNC: 11.2 PG/ML
TESTOST SERPL-MCNC: 346.1 NG/DL

## 2019-04-02 ENCOUNTER — APPOINTMENT (OUTPATIENT)
Dept: UROLOGY | Facility: CLINIC | Age: 59
End: 2019-04-02

## 2019-05-17 ENCOUNTER — MESSAGE (OUTPATIENT)
Age: 59
End: 2019-05-17

## 2019-07-18 ENCOUNTER — TRANSCRIPTION ENCOUNTER (OUTPATIENT)
Age: 59
End: 2019-07-18

## 2019-08-16 ENCOUNTER — MEDICATION RENEWAL (OUTPATIENT)
Age: 59
End: 2019-08-16

## 2019-08-20 ENCOUNTER — MEDICATION RENEWAL (OUTPATIENT)
Age: 59
End: 2019-08-20

## 2019-09-03 ENCOUNTER — TRANSCRIPTION ENCOUNTER (OUTPATIENT)
Age: 59
End: 2019-09-03

## 2019-09-04 ENCOUNTER — APPOINTMENT (OUTPATIENT)
Dept: UROLOGY | Facility: CLINIC | Age: 59
End: 2019-09-04

## 2019-09-06 ENCOUNTER — APPOINTMENT (OUTPATIENT)
Dept: UROLOGY | Facility: CLINIC | Age: 59
End: 2019-09-06
Payer: COMMERCIAL

## 2019-09-06 VITALS
HEART RATE: 72 BPM | TEMPERATURE: 98.6 F | DIASTOLIC BLOOD PRESSURE: 78 MMHG | HEIGHT: 69 IN | RESPIRATION RATE: 17 BRPM | BODY MASS INDEX: 31.84 KG/M2 | WEIGHT: 215 LBS | SYSTOLIC BLOOD PRESSURE: 119 MMHG

## 2019-09-06 DIAGNOSIS — R35.0 FREQUENCY OF MICTURITION: ICD-10-CM

## 2019-09-06 LAB
TESTOST BND SERPL-MCNC: 11.7 PG/ML
TESTOST SERPL-MCNC: 438.2 NG/DL

## 2019-09-06 PROCEDURE — 99213 OFFICE O/P EST LOW 20 MIN: CPT

## 2019-09-06 NOTE — PHYSICAL EXAM
[General Appearance - Well Developed] : well developed [General Appearance - Well Nourished] : well nourished [General Appearance - In No Acute Distress] : no acute distress [Abdomen Soft] : soft [Abdomen Tenderness] : non-tender [Costovertebral Angle Tenderness] : no ~M costovertebral angle tenderness [Skin Color & Pigmentation] : normal skin color and pigmentation [Edema] : no peripheral edema [] : no respiratory distress [Normal Station and Gait] : the gait and station were normal for the patient's age [Oriented To Time, Place, And Person] : oriented to person, place, and time [No Focal Deficits] : no focal deficits

## 2019-09-06 NOTE — ASSESSMENT
[FreeTextEntry1] : --Cont T replacement\par --Cont cialis daily\par --RTC in 6mo with PSA, T, CBC, CMP\par

## 2019-09-06 NOTE — HISTORY OF PRESENT ILLNESS
[FreeTextEntry1] : 60yo gentleman with cc of hypogonadism. Pt has been seen in the past by Dr Sherwood. Originally seen over 2y ago. Had been seen by  prior and was told he was hypogonadal. Has ED as well managed with cilais daily. Helps with his BPH sx of urinary frequency improved with meds. Pts T is managed with androgel 1 pumps BID. Most recent T stable at 438. \par \par PSAs have been in the 4-5 range. Has hx of elevated PSA. Had negative prostate biopsy 4/2016. No family hx of prostate ca. PSA stable at 4.46 in March\par \par LFTs were rising earlier this year. Saw PCP and had resolved. Still on crestor. Foot injury has improved but pt upset about weight gain.

## 2019-10-24 ENCOUNTER — APPOINTMENT (OUTPATIENT)
Dept: UROLOGY | Facility: CLINIC | Age: 59
End: 2019-10-24
Payer: COMMERCIAL

## 2019-10-24 VITALS
RESPIRATION RATE: 17 BRPM | BODY MASS INDEX: 31.84 KG/M2 | OXYGEN SATURATION: 98 % | HEART RATE: 87 BPM | SYSTOLIC BLOOD PRESSURE: 122 MMHG | HEIGHT: 69 IN | WEIGHT: 215 LBS | DIASTOLIC BLOOD PRESSURE: 74 MMHG

## 2019-10-24 PROCEDURE — 99214 OFFICE O/P EST MOD 30 MIN: CPT

## 2019-10-24 NOTE — ASSESSMENT
[FreeTextEntry1] : L epididymitis\par --Scrotal support\par --IBU\par --Scrotal US\par --UA, UCx\par \par hypogonadism\par --Cont T replacement\par --Cont cialis daily\par --RTC in 6mo with PSA, T, CBC, CMP\par

## 2019-10-24 NOTE — PHYSICAL EXAM
[General Appearance - Well Developed] : well developed [General Appearance - In No Acute Distress] : no acute distress [General Appearance - Well Nourished] : well nourished [Abdomen Tenderness] : non-tender [Abdomen Soft] : soft [Skin Color & Pigmentation] : normal skin color and pigmentation [Edema] : no peripheral edema [Costovertebral Angle Tenderness] : no ~M costovertebral angle tenderness [] : no respiratory distress [Oriented To Time, Place, And Person] : oriented to person, place, and time [Normal Station and Gait] : the gait and station were normal for the patient's age [No Focal Deficits] : no focal deficits [FreeTextEntry1] : Tenderness of L epididymal head and L cord, L cord enlarged relative to R, normal R &L testis

## 2019-10-24 NOTE — HISTORY OF PRESENT ILLNESS
[FreeTextEntry1] : 60yo gentleman with cc of L groin pain. pt reports pain over the last several days. Pain is intermittent. Pain is a squeezing type of pain and will be from testis and then up into inguinal region. Pain is exacerbated by sitting or increased pressure (lifting). No clear alleviating factors. Not reproducible or exacerbated by palpation. No change in urinary sx. No penile pain. Unable to provide urine sample today. No inguinal bulge. \par \par Pt seen primarily hypogonadism. Pt has been seen in the past by Dr Sherwood. Originally seen over 2y ago. Had been seen by  prior and was told he was hypogonadal. Has ED as well managed with cilais daily. Helps with his BPH sx of urinary frequency improved with meds. Pts T is managed with androgel 1 pumps BID. Most recent T stable at 438. PSAs have been in the 4-5 range. Has hx of elevated PSA. Had negative prostate biopsy 4/2016. No family hx of prostate ca. PSA stable at 4.46 in March. LFTs were rising earlier this year. Saw PCP and had resolved. Still on crestor. Foot injury has improved but pt upset about weight gain.

## 2019-10-28 LAB
APPEARANCE: CLEAR
BACTERIA UR CULT: NORMAL
BACTERIA: ABNORMAL
BILIRUBIN URINE: NEGATIVE
BLOOD URINE: NEGATIVE
CALCIUM OXALATE CRYSTALS: ABNORMAL
COLOR: YELLOW
GLUCOSE QUALITATIVE U: NEGATIVE
HYALINE CASTS: 0 /LPF
KETONES URINE: NEGATIVE
LEUKOCYTE ESTERASE URINE: NEGATIVE
MICROSCOPIC-UA: NORMAL
NITRITE URINE: NEGATIVE
PH URINE: 6
PROTEIN URINE: NORMAL
RED BLOOD CELLS URINE: 1 /HPF
SPECIFIC GRAVITY URINE: 1.03
SQUAMOUS EPITHELIAL CELLS: 0 /HPF
UROBILINOGEN URINE: NORMAL
WHITE BLOOD CELLS URINE: 3 /HPF

## 2019-11-14 ENCOUNTER — MESSAGE (OUTPATIENT)
Age: 59
End: 2019-11-14

## 2020-03-23 ENCOUNTER — APPOINTMENT (OUTPATIENT)
Dept: UROLOGY | Facility: CLINIC | Age: 60
End: 2020-03-23

## 2020-04-29 ENCOUNTER — TRANSCRIPTION ENCOUNTER (OUTPATIENT)
Age: 60
End: 2020-04-29

## 2020-04-30 ENCOUNTER — TRANSCRIPTION ENCOUNTER (OUTPATIENT)
Age: 60
End: 2020-04-30

## 2020-05-08 ENCOUNTER — APPOINTMENT (OUTPATIENT)
Dept: UROLOGY | Facility: CLINIC | Age: 60
End: 2020-05-08

## 2020-05-18 ENCOUNTER — APPOINTMENT (OUTPATIENT)
Dept: UROLOGY | Facility: CLINIC | Age: 60
End: 2020-05-18
Payer: COMMERCIAL

## 2020-05-18 ENCOUNTER — OUTPATIENT (OUTPATIENT)
Dept: OUTPATIENT SERVICES | Facility: HOSPITAL | Age: 60
LOS: 1 days | End: 2020-05-18
Payer: COMMERCIAL

## 2020-05-18 VITALS — TEMPERATURE: 98.5 F

## 2020-05-18 DIAGNOSIS — N45.1 EPIDIDYMITIS: ICD-10-CM

## 2020-05-18 DIAGNOSIS — R35.0 FREQUENCY OF MICTURITION: ICD-10-CM

## 2020-05-18 LAB
ALBUMIN SERPL ELPH-MCNC: 4.9 G/DL
ALP BLD-CCNC: 77 U/L
ALT SERPL-CCNC: 25 U/L
ANION GAP SERPL CALC-SCNC: 14 MMOL/L
AST SERPL-CCNC: 22 U/L
BASOPHILS # BLD AUTO: 0.04 K/UL
BASOPHILS NFR BLD AUTO: 0.8 %
BILIRUB SERPL-MCNC: 0.8 MG/DL
BUN SERPL-MCNC: 17 MG/DL
CALCIUM SERPL-MCNC: 9.6 MG/DL
CHLORIDE SERPL-SCNC: 99 MMOL/L
CO2 SERPL-SCNC: 27 MMOL/L
CREAT SERPL-MCNC: 1.18 MG/DL
EOSINOPHIL # BLD AUTO: 0.17 K/UL
EOSINOPHIL NFR BLD AUTO: 3.3 %
GLUCOSE SERPL-MCNC: 113 MG/DL
HCT VFR BLD CALC: 49.8 %
HGB BLD-MCNC: 16.3 G/DL
IMM GRANULOCYTES NFR BLD AUTO: 0.4 %
LYMPHOCYTES # BLD AUTO: 1.51 K/UL
LYMPHOCYTES NFR BLD AUTO: 29 %
MAN DIFF?: NORMAL
MCHC RBC-ENTMCNC: 30 PG
MCHC RBC-ENTMCNC: 32.7 GM/DL
MCV RBC AUTO: 91.7 FL
MONOCYTES # BLD AUTO: 0.44 K/UL
MONOCYTES NFR BLD AUTO: 8.5 %
NEUTROPHILS # BLD AUTO: 3.02 K/UL
NEUTROPHILS NFR BLD AUTO: 58 %
PLATELET # BLD AUTO: 169 K/UL
POTASSIUM SERPL-SCNC: 4.6 MMOL/L
PROT SERPL-MCNC: 7.5 G/DL
PSA FREE FLD-MCNC: 23 %
PSA FREE SERPL-MCNC: 1.16 NG/ML
PSA SERPL-MCNC: 5 NG/ML
RBC # BLD: 5.43 M/UL
RBC # FLD: 13.2 %
SODIUM SERPL-SCNC: 140 MMOL/L
TESTOST BND SERPL-MCNC: 13 PG/ML
TESTOST SERPL-MCNC: 328.5 NG/DL
WBC # FLD AUTO: 5.2 K/UL

## 2020-05-18 PROCEDURE — 99214 OFFICE O/P EST MOD 30 MIN: CPT | Mod: 25

## 2020-05-18 PROCEDURE — 76870 US EXAM SCROTUM: CPT | Mod: 26

## 2020-05-18 PROCEDURE — 76870 US EXAM SCROTUM: CPT

## 2020-05-18 NOTE — HISTORY OF PRESENT ILLNESS
[FreeTextEntry1] : 60yo gentleman with cc of low T and prior L groin pain. He was seen 6mo ago for a squeezing type of pain and will be from testis and then up into inguinal region. Pain is exacerbated by sitting or increased pressure (lifting). No clear alleviating factors. Not reproducible or exacerbated by palpation. No change in urinary sx. No penile pain. Had US that showed R epididymal cyst vs spermatocele. F/u US recommended. B epididymal cysts seen today. No discomfort since last visit. \par \par Pt seen primarily for hypogonadism. Pt has been seen in the past by Dr Sherwood. Originally seen over 2y ago. Had been seen by  prior and was told he was hypogonadal. Has ED as well managed with cilais daily. Helps with his BPH sx of urinary frequency improved with meds. Pts T is managed with androgel 1 pumps BID. Most recent T stable at 328. PSAs have been in the 4-5 range, 5 on check this month. Has hx of elevated PSA. Had negative prostate biopsy 4/2016. No family hx of prostate ca. LFTs were rising last year. Saw PCP and had resolved. Still on crestor. Check this month shows LFTs remain good. 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

## 2020-05-18 NOTE — ASSESSMENT
[FreeTextEntry1] : Epididynmal cysts. No sx. No concerning findings. Reassurance provided. \par \par hypogonadism\par --Cont T replacement\par --Cont cialis daily\par --RTC in 6mo with PSA, T, CBC, CMP\par

## 2020-05-18 NOTE — PHYSICAL EXAM
[General Appearance - Well Developed] : well developed [General Appearance - Well Nourished] : well nourished [Abdomen Soft] : soft [Abdomen Tenderness] : non-tender [General Appearance - In No Acute Distress] : no acute distress [Costovertebral Angle Tenderness] : no ~M costovertebral angle tenderness [Skin Color & Pigmentation] : normal skin color and pigmentation [Edema] : no peripheral edema [Oriented To Time, Place, And Person] : oriented to person, place, and time [] : no respiratory distress [No Focal Deficits] : no focal deficits [Normal Station and Gait] : the gait and station were normal for the patient's age [FreeTextEntry1] : 40cc prostate, smooth, no nodules [Cervical Lymph Nodes Enlarged Anterior Bilaterally] : anterior cervical [Supraclavicular Lymph Nodes Enlarged Bilaterally] : supraclavicular

## 2020-05-21 DIAGNOSIS — R79.89 OTHER SPECIFIED ABNORMAL FINDINGS OF BLOOD CHEMISTRY: ICD-10-CM

## 2020-05-21 DIAGNOSIS — R97.20 ELEVATED PROSTATE SPECIFIC ANTIGEN [PSA]: ICD-10-CM

## 2020-05-21 DIAGNOSIS — N45.1 EPIDIDYMITIS: ICD-10-CM

## 2020-07-22 ENCOUNTER — RX RENEWAL (OUTPATIENT)
Age: 60
End: 2020-07-22

## 2020-07-23 ENCOUNTER — TRANSCRIPTION ENCOUNTER (OUTPATIENT)
Age: 60
End: 2020-07-23

## 2020-07-24 ENCOUNTER — TRANSCRIPTION ENCOUNTER (OUTPATIENT)
Age: 60
End: 2020-07-24

## 2020-10-13 ENCOUNTER — TRANSCRIPTION ENCOUNTER (OUTPATIENT)
Age: 60
End: 2020-10-13

## 2020-11-06 ENCOUNTER — APPOINTMENT (OUTPATIENT)
Dept: UROLOGY | Facility: CLINIC | Age: 60
End: 2020-11-06
Payer: COMMERCIAL

## 2020-11-06 VITALS — TEMPERATURE: 97.9 F

## 2020-11-06 VITALS — HEART RATE: 72 BPM | DIASTOLIC BLOOD PRESSURE: 71 MMHG | RESPIRATION RATE: 16 BRPM | SYSTOLIC BLOOD PRESSURE: 124 MMHG

## 2020-11-06 PROCEDURE — 99213 OFFICE O/P EST LOW 20 MIN: CPT

## 2020-11-06 PROCEDURE — 99072 ADDL SUPL MATRL&STAF TM PHE: CPT

## 2020-11-06 NOTE — PHYSICAL EXAM
[Abdomen Soft] : soft [Abdomen Tenderness] : non-tender [Costovertebral Angle Tenderness] : no ~M costovertebral angle tenderness [Cervical Lymph Nodes Enlarged Anterior Bilaterally] : anterior cervical [Supraclavicular Lymph Nodes Enlarged Bilaterally] : supraclavicular [General Appearance - Well Developed] : well developed [General Appearance - Well Nourished] : well nourished [Normal Appearance] : normal appearance [Well Groomed] : well groomed [General Appearance - In No Acute Distress] : no acute distress [Skin Color & Pigmentation] : normal skin color and pigmentation [Heart Rate And Rhythm] : Heart rate and rhythm were normal [Edema] : no peripheral edema [] : no respiratory distress [Oriented To Time, Place, And Person] : oriented to person, place, and time [Normal Station and Gait] : the gait and station were normal for the patient's age [No Focal Deficits] : no focal deficits

## 2020-11-06 NOTE — REVIEW OF SYSTEMS
[Fever] : no fever [Chills] : no chills [Eye Pain] : no eye pain [Red Eyes] : eyes not red [Earache] : no earache [Chest Pain] : no chest pain [Shortness Of Breath] : no shortness of breath [Cough] : no cough [Abdominal Pain] : no abdominal pain [Constipation] : no constipation [Dysuria] : no dysuria [Incontinence] : no incontinence [Hesitancy] : no urinary hesitancy [Nocturia] : no nocturia [Arthralgias] : no arthralgias [Skin Lesions] : no skin lesions [Itching] : no itching [Confused] : no confusion [Suicidal] : not suicidal [Proptosis] : no proptosis [Easy Bleeding] : no tendency for easy bleeding

## 2020-11-10 LAB
ALBUMIN SERPL ELPH-MCNC: 4.9 G/DL
ALP BLD-CCNC: 77 U/L
ALT SERPL-CCNC: 28 U/L
ANION GAP SERPL CALC-SCNC: 14 MMOL/L
AST SERPL-CCNC: 25 U/L
BASOPHILS # BLD AUTO: 0.07 K/UL
BASOPHILS NFR BLD AUTO: 1.2 %
BILIRUB SERPL-MCNC: 0.7 MG/DL
BUN SERPL-MCNC: 16 MG/DL
CALCIUM SERPL-MCNC: 9.9 MG/DL
CHLORIDE SERPL-SCNC: 98 MMOL/L
CO2 SERPL-SCNC: 27 MMOL/L
CREAT SERPL-MCNC: 1.08 MG/DL
EOSINOPHIL # BLD AUTO: 0.09 K/UL
EOSINOPHIL NFR BLD AUTO: 1.5 %
GLUCOSE SERPL-MCNC: 66 MG/DL
HCT VFR BLD CALC: 50.2 %
HGB BLD-MCNC: 16.7 G/DL
IMM GRANULOCYTES NFR BLD AUTO: 0.8 %
LYMPHOCYTES # BLD AUTO: 1.81 K/UL
LYMPHOCYTES NFR BLD AUTO: 30.1 %
MAN DIFF?: NORMAL
MCHC RBC-ENTMCNC: 30.9 PG
MCHC RBC-ENTMCNC: 33.3 GM/DL
MCV RBC AUTO: 92.8 FL
MONOCYTES # BLD AUTO: 0.5 K/UL
MONOCYTES NFR BLD AUTO: 8.3 %
NEUTROPHILS # BLD AUTO: 3.49 K/UL
NEUTROPHILS NFR BLD AUTO: 58.1 %
PLATELET # BLD AUTO: 176 K/UL
POTASSIUM SERPL-SCNC: 4.7 MMOL/L
PROT SERPL-MCNC: 7.7 G/DL
PSA FREE FLD-MCNC: 26 %
PSA FREE SERPL-MCNC: 1.21 NG/ML
PSA SERPL-MCNC: 4.68 NG/ML
RBC # BLD: 5.41 M/UL
RBC # FLD: 13.1 %
SODIUM SERPL-SCNC: 138 MMOL/L
TESTOST SERPL-MCNC: 313 NG/DL
WBC # FLD AUTO: 6.01 K/UL

## 2020-11-10 NOTE — HISTORY OF PRESENT ILLNESS
[Urinary Incontinence] : no urinary incontinence [Urinary Retention] : no urinary retention [Urinary Urgency] : no urinary urgency [Urinary Frequency] : no urinary frequency [Nocturia] : no nocturia [Weak Stream] : no weak stream [Intermittency] : no intermittency [Erectile Dysfunction] : no Erectile Dysfunction [Dysuria] : no dysuria [Hematuria - Gross] : no gross hematuria [FreeTextEntry1] : 59yo gentleman with cc of low T.  Pt seen primarily for hypogonadism. Pt has been seen in the past by Dr Sherwood. Had been seen by  prior and was told he was hypogonadal. Has ED as well managed with cilais daily. Helps with his BPH sx of urinary frequency improved with meds. Pts T is managed with androgel 1 pumps BID. Most recent T stable at 328. PSAs have been in the 4-5 range, 4.68 on check last month. Has hx of elevated PSA. Had negative prostate biopsy 4/2016. No family hx of prostate ca. LFTs were rising last year. Saw PCP and had resolved. Still on crestor. Check this month shows LFTs remain good. 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 CBC and T are also good.

## 2020-11-10 NOTE — HISTORY OF PRESENT ILLNESS
[Urinary Incontinence] : no urinary incontinence [Urinary Retention] : no urinary retention [Urinary Urgency] : no urinary urgency [Urinary Frequency] : no urinary frequency [Nocturia] : no nocturia [Weak Stream] : no weak stream [Intermittency] : no intermittency [Erectile Dysfunction] : no Erectile Dysfunction [Dysuria] : no dysuria [Hematuria - Gross] : no gross hematuria [FreeTextEntry1] : 61yo gentleman with cc of low T.  Pt seen primarily for hypogonadism. Pt has been seen in the past by Dr Sherwood. Had been seen by  prior and was told he was hypogonadal. Has ED as well managed with cilais daily. Helps with his BPH sx of urinary frequency improved with meds. Pts T is managed with androgel 1 pumps BID. Most recent T stable at 328. PSAs have been in the 4-5 range, 4.68 on check last month. Has hx of elevated PSA. Had negative prostate biopsy 4/2016. No family hx of prostate ca. LFTs were rising last year. Saw PCP and had resolved. Still on crestor. Check this month shows LFTs remain good. 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 CBC and T are also good.

## 2020-11-16 ENCOUNTER — TRANSCRIPTION ENCOUNTER (OUTPATIENT)
Age: 60
End: 2020-11-16

## 2020-11-23 ENCOUNTER — TRANSCRIPTION ENCOUNTER (OUTPATIENT)
Age: 60
End: 2020-11-23

## 2020-11-24 ENCOUNTER — TRANSCRIPTION ENCOUNTER (OUTPATIENT)
Age: 60
End: 2020-11-24

## 2021-01-05 ENCOUNTER — NON-APPOINTMENT (OUTPATIENT)
Age: 61
End: 2021-01-05

## 2021-01-20 ENCOUNTER — APPOINTMENT (OUTPATIENT)
Dept: UROLOGY | Facility: CLINIC | Age: 61
End: 2021-01-20
Payer: COMMERCIAL

## 2021-01-20 VITALS
HEART RATE: 82 BPM | RESPIRATION RATE: 18 BRPM | DIASTOLIC BLOOD PRESSURE: 64 MMHG | WEIGHT: 215 LBS | SYSTOLIC BLOOD PRESSURE: 113 MMHG | BODY MASS INDEX: 31.84 KG/M2 | HEIGHT: 69 IN | TEMPERATURE: 97.4 F

## 2021-01-20 DIAGNOSIS — R97.20 ELEVATED PROSTATE, SPECIFIC ANTIGEN [PSA]: ICD-10-CM

## 2021-01-20 PROCEDURE — 99213 OFFICE O/P EST LOW 20 MIN: CPT

## 2021-01-20 PROCEDURE — 99072 ADDL SUPL MATRL&STAF TM PHE: CPT

## 2021-01-20 NOTE — PHYSICAL EXAM
[General Appearance - Well Developed] : well developed [General Appearance - Well Nourished] : well nourished [General Appearance - In No Acute Distress] : no acute distress [Abdomen Soft] : soft [Abdomen Tenderness] : non-tender [Costovertebral Angle Tenderness] : no ~M costovertebral angle tenderness [Skin Color & Pigmentation] : normal skin color and pigmentation [Edema] : no peripheral edema [] : no respiratory distress [Normal Station and Gait] : the gait and station were normal for the patient's age [Oriented To Time, Place, And Person] : oriented to person, place, and time [No Focal Deficits] : no focal deficits [Cervical Lymph Nodes Enlarged Anterior Bilaterally] : anterior cervical [Supraclavicular Lymph Nodes Enlarged Bilaterally] : supraclavicular

## 2021-01-21 LAB
PSA FREE FLD-MCNC: 17 %
PSA FREE SERPL-MCNC: 1.31 NG/ML
PSA SERPL-MCNC: 7.9 NG/ML
TESTOST BND SERPL-MCNC: 11.2 PG/ML
TESTOST SERPL-MCNC: 222.2 NG/DL

## 2021-01-26 PROBLEM — R97.20 ELEVATED PROSTATE SPECIFIC ANTIGEN (PSA): Status: ACTIVE | Noted: 2020-11-06

## 2021-01-26 NOTE — ASSESSMENT
[FreeTextEntry1] : PSA elevated. Recommended prostate biopsy. Discussed risks of prostate biopsy including but not limited to bleeding, infection and even sepsis. Pt is not on any blood thinners. Plan on MRI prior to determine size, PSA density and potentially targetable lesions for biopsy. \par --MRI. will call pt with results\par --Prostate bx.

## 2021-01-26 NOTE — HISTORY OF PRESENT ILLNESS
[FreeTextEntry1] : 59yo gentleman with cc of low T.  Pt seen primarily for hypogonadism. Pt has been seen in the past by Dr Sherwood. Had been seen by  prior and was told he was hypogonadal. Has ED as well managed with cilais daily. Helps with his BPH sx of urinary frequency improved with meds. Pts T is managed with androgel 1 pumps BID. Most recent T stable at 328. PSAs have been in the 4-5 range, 4.68 on check last month. Has hx of elevated PSA. Had negative prostate biopsy 4/2016. No family hx of prostate ca. LFTs were rising last year. Saw PCP and had resolved. Still on crestor. Check this month shows LFTs remain good. 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 CBC and T are also good. \par \par Pt with PCP eval with PSA elevation to 5.7. Plan made for repeat with repeat T. PSA elevated to 7.9 T is normal (low). No change in urinary sx. No recent illness.

## 2021-02-04 ENCOUNTER — NON-APPOINTMENT (OUTPATIENT)
Age: 61
End: 2021-02-04

## 2021-02-19 ENCOUNTER — NON-APPOINTMENT (OUTPATIENT)
Age: 61
End: 2021-02-19

## 2021-02-24 ENCOUNTER — OUTPATIENT (OUTPATIENT)
Dept: OUTPATIENT SERVICES | Facility: HOSPITAL | Age: 61
LOS: 1 days | End: 2021-02-24
Payer: COMMERCIAL

## 2021-02-24 ENCOUNTER — APPOINTMENT (OUTPATIENT)
Dept: UROLOGY | Facility: CLINIC | Age: 61
End: 2021-02-24
Payer: COMMERCIAL

## 2021-02-24 ENCOUNTER — LABORATORY RESULT (OUTPATIENT)
Age: 61
End: 2021-02-24

## 2021-02-24 VITALS
TEMPERATURE: 97.7 F | DIASTOLIC BLOOD PRESSURE: 78 MMHG | BODY MASS INDEX: 31.84 KG/M2 | SYSTOLIC BLOOD PRESSURE: 138 MMHG | WEIGHT: 215 LBS | HEART RATE: 82 BPM | HEIGHT: 69 IN | RESPIRATION RATE: 16 BRPM

## 2021-02-24 VITALS — SYSTOLIC BLOOD PRESSURE: 118 MMHG | DIASTOLIC BLOOD PRESSURE: 71 MMHG | HEART RATE: 86 BPM

## 2021-02-24 DIAGNOSIS — R35.0 FREQUENCY OF MICTURITION: ICD-10-CM

## 2021-02-24 PROCEDURE — 76942 ECHO GUIDE FOR BIOPSY: CPT | Mod: 26,59

## 2021-02-24 PROCEDURE — 55700: CPT | Mod: 22

## 2021-02-24 PROCEDURE — 76872 US TRANSRECTAL: CPT | Mod: 26

## 2021-02-24 PROCEDURE — 76942 ECHO GUIDE FOR BIOPSY: CPT | Mod: 59

## 2021-02-24 PROCEDURE — 55700: CPT

## 2021-02-24 PROCEDURE — 76872 US TRANSRECTAL: CPT

## 2021-02-25 DIAGNOSIS — R97.20 ELEVATED PROSTATE SPECIFIC ANTIGEN [PSA]: ICD-10-CM

## 2021-04-19 ENCOUNTER — TRANSCRIPTION ENCOUNTER (OUTPATIENT)
Age: 61
End: 2021-04-19

## 2021-05-03 ENCOUNTER — APPOINTMENT (OUTPATIENT)
Dept: UROLOGY | Facility: CLINIC | Age: 61
End: 2021-05-03
Payer: COMMERCIAL

## 2021-05-03 LAB
ALBUMIN SERPL ELPH-MCNC: 4.8 G/DL
ALP BLD-CCNC: 79 U/L
ALT SERPL-CCNC: 26 U/L
ANION GAP SERPL CALC-SCNC: 9 MMOL/L
AST SERPL-CCNC: 22 U/L
BASOPHILS # BLD AUTO: 0.05 K/UL
BASOPHILS NFR BLD AUTO: 0.9 %
BILIRUB SERPL-MCNC: 0.7 MG/DL
BUN SERPL-MCNC: 16 MG/DL
CALCIUM SERPL-MCNC: 9.9 MG/DL
CHLORIDE SERPL-SCNC: 101 MMOL/L
CO2 SERPL-SCNC: 30 MMOL/L
CREAT SERPL-MCNC: 1.17 MG/DL
EOSINOPHIL # BLD AUTO: 0.1 K/UL
EOSINOPHIL NFR BLD AUTO: 1.7 %
GLUCOSE SERPL-MCNC: 93 MG/DL
HCT VFR BLD CALC: 49.2 %
HGB BLD-MCNC: 15.9 G/DL
IMM GRANULOCYTES NFR BLD AUTO: 0.9 %
LYMPHOCYTES # BLD AUTO: 1.73 K/UL
LYMPHOCYTES NFR BLD AUTO: 29.6 %
MAN DIFF?: NORMAL
MCHC RBC-ENTMCNC: 30.2 PG
MCHC RBC-ENTMCNC: 32.3 GM/DL
MCV RBC AUTO: 93.4 FL
MONOCYTES # BLD AUTO: 0.52 K/UL
MONOCYTES NFR BLD AUTO: 8.9 %
NEUTROPHILS # BLD AUTO: 3.4 K/UL
NEUTROPHILS NFR BLD AUTO: 58 %
PLATELET # BLD AUTO: 173 K/UL
POTASSIUM SERPL-SCNC: 4.6 MMOL/L
PROT SERPL-MCNC: 7.7 G/DL
PSA FREE FLD-MCNC: 17 %
PSA FREE SERPL-MCNC: 1.87 NG/ML
PSA SERPL-MCNC: 10.7 NG/ML
RBC # BLD: 5.27 M/UL
RBC # FLD: 13.4 %
SODIUM SERPL-SCNC: 140 MMOL/L
TESTOST BND SERPL-MCNC: 9.1 PG/ML
TESTOST SERPL-MCNC: 241 NG/DL
WBC # FLD AUTO: 5.85 K/UL

## 2021-05-03 PROCEDURE — 99213 OFFICE O/P EST LOW 20 MIN: CPT

## 2021-05-03 PROCEDURE — 99072 ADDL SUPL MATRL&STAF TM PHE: CPT

## 2021-05-04 NOTE — PHYSICAL EXAM
[General Appearance - Well Developed] : well developed [General Appearance - Well Nourished] : well nourished [General Appearance - In No Acute Distress] : no acute distress [Abdomen Soft] : soft [Abdomen Tenderness] : non-tender [Costovertebral Angle Tenderness] : no ~M costovertebral angle tenderness [Skin Color & Pigmentation] : normal skin color and pigmentation [Edema] : no peripheral edema [] : no respiratory distress [Oriented To Time, Place, And Person] : oriented to person, place, and time [Normal Station and Gait] : the gait and station were normal for the patient's age [No Focal Deficits] : no focal deficits [Cervical Lymph Nodes Enlarged Anterior Bilaterally] : anterior cervical [Supraclavicular Lymph Nodes Enlarged Bilaterally] : supraclavicular [FreeTextEntry1] : 40cc prostate, smooth, no nodules

## 2021-05-04 NOTE — HISTORY OF PRESENT ILLNESS
[FreeTextEntry1] : 59yo gentleman with cc of low T.  Pt seen primarily for hypogonadism. Pt has been seen in the past by Dr Sherwood. Had been seen by  prior and was told he was hypogonadal. Has ED as well managed with cilais daily. Helps with his BPH sx of urinary frequency improved with meds. Pts T is managed with androgel 1 pumps BID. Most recent T stable at 328. PSAs have been in the 4-5 range. Has hx of elevated PSA. Had negative prostate biopsy 4/2016. No family hx of prostate ca. LFTs were rising last year. Saw PCP and had resolved. Still on crestor. 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 \par \par Pt with PCP eval with PSA elevation to 5.7 earlier this year. Plan made for repeat with repeat T. PSA elevated to 7.9 T is normal (low). No change in urinary sx. No recent illness. Pt underwent MRI ([facility]P) that showed 52cc prostate with no suspicious lesions. He underwent bx that was negative. His PSA however continues to rise. Now 10.7. 2mo after bx. No change in urinary sx. No dysuria. His other labs look ok. T is again borderline but pt denies issues with libido or erections.

## 2021-05-04 NOTE — ASSESSMENT
[FreeTextEntry1] : PSA elevated. Negative Bx x2 (most recently 2mo ago) and negative MRI. Suspect inflammatory post bx. Will monitor. \par --repeat PSA in 2mo. will call pt with results\par \par Hypogonadism. T is decreased but pt without change in sx and feels well. WOuld also like to avoid increasing dose in setting of rising PSA. \par --COnt androgel\par --Repeat labs in 4mo

## 2021-05-05 LAB
APPEARANCE: CLEAR
BACTERIA: NEGATIVE
BILIRUBIN URINE: NEGATIVE
BLOOD URINE: NORMAL
COLOR: NORMAL
GLUCOSE QUALITATIVE U: NEGATIVE
HYALINE CASTS: 0 /LPF
KETONES URINE: NEGATIVE
LEUKOCYTE ESTERASE URINE: NEGATIVE
MICROSCOPIC-UA: NORMAL
NITRITE URINE: NEGATIVE
PH URINE: 6
PROTEIN URINE: NEGATIVE
RED BLOOD CELLS URINE: 1 /HPF
SPECIFIC GRAVITY URINE: 1.02
SQUAMOUS EPITHELIAL CELLS: 0 /HPF
UROBILINOGEN URINE: NORMAL
WHITE BLOOD CELLS URINE: 2 /HPF

## 2021-07-20 ENCOUNTER — TRANSCRIPTION ENCOUNTER (OUTPATIENT)
Age: 61
End: 2021-07-20

## 2021-07-27 LAB — PSA SERPL-MCNC: 5.52 NG/ML

## 2021-08-10 RX ORDER — DIAZEPAM 5 MG/1
5 TABLET ORAL
Qty: 2 | Refills: 0 | Status: DISCONTINUED | COMMUNITY
Start: 2021-01-20 | End: 2021-08-10

## 2021-08-10 RX ORDER — ALPRAZOLAM 0.25 MG/1
0.25 TABLET ORAL
Qty: 1 | Refills: 0 | Status: DISCONTINUED | COMMUNITY
Start: 2018-01-17 | End: 2021-08-10

## 2021-09-15 ENCOUNTER — NON-APPOINTMENT (OUTPATIENT)
Age: 61
End: 2021-09-15

## 2021-10-07 ENCOUNTER — TRANSCRIPTION ENCOUNTER (OUTPATIENT)
Age: 61
End: 2021-10-07

## 2021-10-18 ENCOUNTER — APPOINTMENT (OUTPATIENT)
Dept: UROLOGY | Facility: CLINIC | Age: 61
End: 2021-10-18
Payer: COMMERCIAL

## 2021-10-18 DIAGNOSIS — N28.9 DISORDER OF KIDNEY AND URETER, UNSPECIFIED: ICD-10-CM

## 2021-10-18 PROCEDURE — 99213 OFFICE O/P EST LOW 20 MIN: CPT

## 2021-10-18 NOTE — ED ADULT NURSE NOTE - ATTACK CIRCUMSTANCES
First bag of PRBC started at 0957. Consent was obtained via phone by MARIAH Addison RN and LUIS Dobbs RN from pt dtr, [employer]. VSS at this time. Will continue to monitor for adverse reactions from transfusion.       10/18/21 0957   Vitals   BP (!) 103/51   Temp 97.8 °F (36.6 °C)   Temp Source Oral   Pulse 100   Resp 16   SpO2 92 %
unprovoked

## 2021-10-21 LAB
ALBUMIN SERPL ELPH-MCNC: 4.9 G/DL
ALP BLD-CCNC: 80 U/L
ALT SERPL-CCNC: 41 U/L
ANION GAP SERPL CALC-SCNC: 11 MMOL/L
ANION GAP SERPL CALC-SCNC: 20 MMOL/L
APPEARANCE: CLEAR
AST SERPL-CCNC: 25 U/L
BACTERIA UR CULT: NORMAL
BACTERIA: NEGATIVE
BASOPHILS # BLD AUTO: 0.04 K/UL
BASOPHILS NFR BLD AUTO: 0.6 %
BILIRUB SERPL-MCNC: 0.6 MG/DL
BILIRUBIN URINE: NEGATIVE
BLOOD URINE: NEGATIVE
BUN SERPL-MCNC: 16 MG/DL
BUN SERPL-MCNC: 21 MG/DL
CALCIUM SERPL-MCNC: 10.2 MG/DL
CALCIUM SERPL-MCNC: 9.8 MG/DL
CHLORIDE SERPL-SCNC: 100 MMOL/L
CHLORIDE SERPL-SCNC: 101 MMOL/L
CO2 SERPL-SCNC: 25 MMOL/L
CO2 SERPL-SCNC: 27 MMOL/L
COLOR: YELLOW
CREAT SERPL-MCNC: 1.18 MG/DL
CREAT SERPL-MCNC: 1.4 MG/DL
EOSINOPHIL # BLD AUTO: 0.03 K/UL
EOSINOPHIL NFR BLD AUTO: 0.5 %
GLUCOSE QUALITATIVE U: NEGATIVE
GLUCOSE SERPL-MCNC: 73 MG/DL
GLUCOSE SERPL-MCNC: 90 MG/DL
HCT VFR BLD CALC: 48.4 %
HGB BLD-MCNC: 16.1 G/DL
HYALINE CASTS: 0 /LPF
IMM GRANULOCYTES NFR BLD AUTO: 0.5 %
KETONES URINE: NEGATIVE
LEUKOCYTE ESTERASE URINE: NEGATIVE
LYMPHOCYTES # BLD AUTO: 1.33 K/UL
LYMPHOCYTES NFR BLD AUTO: 20.8 %
MAN DIFF?: NORMAL
MCHC RBC-ENTMCNC: 30.4 PG
MCHC RBC-ENTMCNC: 33.3 GM/DL
MCV RBC AUTO: 91.5 FL
MICROSCOPIC-UA: NORMAL
MONOCYTES # BLD AUTO: 0.44 K/UL
MONOCYTES NFR BLD AUTO: 6.9 %
NEUTROPHILS # BLD AUTO: 4.53 K/UL
NEUTROPHILS NFR BLD AUTO: 70.7 %
NITRITE URINE: NEGATIVE
PH URINE: 6
PLATELET # BLD AUTO: 174 K/UL
POTASSIUM SERPL-SCNC: 4.5 MMOL/L
POTASSIUM SERPL-SCNC: 4.7 MMOL/L
PROT SERPL-MCNC: 7.7 G/DL
PROTEIN URINE: NORMAL
PSA FREE FLD-MCNC: 24 %
PSA FREE SERPL-MCNC: 1.22 NG/ML
PSA SERPL-MCNC: 5.13 NG/ML
RBC # BLD: 5.29 M/UL
RBC # FLD: 13.4 %
RED BLOOD CELLS URINE: 3 /HPF
SODIUM SERPL-SCNC: 139 MMOL/L
SODIUM SERPL-SCNC: 145 MMOL/L
SPECIFIC GRAVITY URINE: 1.03
SQUAMOUS EPITHELIAL CELLS: 0 /HPF
TESTOST BND SERPL-MCNC: 15.8 PG/ML
TESTOSTERONE TOTAL S: 455 NG/DL
UROBILINOGEN URINE: NORMAL
WBC # FLD AUTO: 6.4 K/UL
WHITE BLOOD CELLS URINE: 1 /HPF

## 2021-10-22 NOTE — ASSESSMENT
[FreeTextEntry1] : PSA elevated. Negative Bx x2 and negative MRI. \par \par Hypogonadism. T is decreased but pt without change in sx and feels well. WOuld also like to avoid increasing dose in setting of rising PSA. \par --COnt androgel\par --Repeat labs in 1y\par \par New rise in Cr\par --BMP recheck. will call pt with results

## 2021-10-22 NOTE — HISTORY OF PRESENT ILLNESS
[FreeTextEntry1] : 60yo gentleman with cc of low T.  Pt seen primarily for hypogonadism. Pt has been seen in the past by Dr Sherwood. Had been seen by  prior and was told he was hypogonadal. Has ED as well managed with cilais daily. Helps with his BPH sx of urinary frequency improved with meds. Pts T is managed with androgel 1 pumps BID. PSAs have been in the 4-5 range. Has hx of elevated PSA. Had negative prostate biopsy 4/2016. No family hx of prostate ca. LFTs were rising last year. Saw PCP and had resolved. Still on crestor. 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 \par \par Pt with PCP eval with PSA elevation to 5.7 earlier this year. Plan made for repeat with repeat T. PSA elevated to 7.9 T is normal (low). No change in urinary sx. No recent illness. Pt underwent MRI ([facility]) that showed 52cc prostate with no suspicious lesions. He underwent bx that was negative. His PSA however continued to rise. Was 10.7. 2mo after bx. No change in urinary sx. No dysuria. His other labs look ok. T is again borderline but pt denies issues with libido or erections. \par \par Plan made for repeat. Most recent PSA now improved at 5.13.  He did abstain prior to this test and wonders if this contributed. T also good at 455 (but taken in afternoon). His Cr was a little higher at 1.4. Baseline 1.1-1.2.

## 2021-11-04 ENCOUNTER — NON-APPOINTMENT (OUTPATIENT)
Age: 61
End: 2021-11-04

## 2021-11-04 ENCOUNTER — TRANSCRIPTION ENCOUNTER (OUTPATIENT)
Age: 61
End: 2021-11-04

## 2021-12-17 ENCOUNTER — TRANSCRIPTION ENCOUNTER (OUTPATIENT)
Age: 61
End: 2021-12-17

## 2022-01-27 ENCOUNTER — TRANSCRIPTION ENCOUNTER (OUTPATIENT)
Age: 62
End: 2022-01-27

## 2022-01-28 ENCOUNTER — TRANSCRIPTION ENCOUNTER (OUTPATIENT)
Age: 62
End: 2022-01-28

## 2022-03-09 ENCOUNTER — TRANSCRIPTION ENCOUNTER (OUTPATIENT)
Age: 62
End: 2022-03-09

## 2022-03-09 DIAGNOSIS — Z79.890 ENCOUNTER FOR THERAPEUTIC DRUG LVL MONITORING: ICD-10-CM

## 2022-03-09 DIAGNOSIS — Z51.81 ENCOUNTER FOR THERAPEUTIC DRUG LVL MONITORING: ICD-10-CM

## 2022-04-04 ENCOUNTER — APPOINTMENT (OUTPATIENT)
Dept: UROLOGY | Facility: CLINIC | Age: 62
End: 2022-04-04
Payer: COMMERCIAL

## 2022-04-04 VITALS — SYSTOLIC BLOOD PRESSURE: 111 MMHG | HEART RATE: 90 BPM | DIASTOLIC BLOOD PRESSURE: 69 MMHG

## 2022-04-04 LAB — BACTERIA UR CULT: NORMAL

## 2022-04-04 PROCEDURE — 99214 OFFICE O/P EST MOD 30 MIN: CPT

## 2022-04-04 RX ORDER — AZITHROMYCIN 250 MG/1
250 TABLET, FILM COATED ORAL
Qty: 6 | Refills: 0 | Status: COMPLETED | COMMUNITY
Start: 2021-12-28

## 2022-04-04 RX ORDER — METHYLPREDNISOLONE 4 MG/1
4 TABLET ORAL
Qty: 21 | Refills: 0 | Status: COMPLETED | COMMUNITY
Start: 2022-01-03

## 2022-04-04 RX ORDER — DICLOFENAC SODIUM 10 MG/G
1 GEL TOPICAL
Qty: 400 | Refills: 0 | Status: COMPLETED | COMMUNITY
Start: 2017-12-01 | End: 2022-04-04

## 2022-04-04 RX ORDER — ONDANSETRON 4 MG/1
4 TABLET ORAL EVERY 6 HOURS
Qty: 45 | Refills: 0 | Status: COMPLETED | COMMUNITY
Start: 2017-06-16 | End: 2022-04-04

## 2022-04-04 RX ORDER — VALACYCLOVIR 1 G/1
1 TABLET, FILM COATED ORAL
Qty: 4 | Refills: 0 | Status: COMPLETED | COMMUNITY
Start: 2022-01-03

## 2022-04-04 RX ORDER — ROSUVASTATIN CALCIUM 5 MG/1
5 TABLET, FILM COATED ORAL
Qty: 90 | Refills: 0 | Status: ACTIVE | COMMUNITY
Start: 2021-11-04

## 2022-04-04 RX ORDER — NAPROXEN 500 MG/1
500 TABLET ORAL
Qty: 60 | Refills: 0 | Status: COMPLETED | COMMUNITY
Start: 2018-01-08 | End: 2022-04-04

## 2022-04-04 RX ORDER — TADALAFIL 5 MG/1
5 TABLET ORAL
Qty: 90 | Refills: 3 | Status: DISCONTINUED | COMMUNITY
Start: 2020-07-22 | End: 2022-04-04

## 2022-04-04 RX ORDER — COVID-19 ANTIGEN TEST
KIT MISCELLANEOUS
Qty: 8 | Refills: 0 | Status: COMPLETED | COMMUNITY
Start: 2022-02-19

## 2022-04-05 LAB
ALBUMIN SERPL ELPH-MCNC: 4.7 G/DL
ALP BLD-CCNC: 67 U/L
ALT SERPL-CCNC: 35 U/L
ANION GAP SERPL CALC-SCNC: 15 MMOL/L
APPEARANCE: CLEAR
AST SERPL-CCNC: 30 U/L
BACTERIA: NEGATIVE
BASOPHILS # BLD AUTO: 0.05 K/UL
BASOPHILS NFR BLD AUTO: 0.8 %
BILIRUB SERPL-MCNC: 0.6 MG/DL
BILIRUBIN URINE: NEGATIVE
BLOOD URINE: NEGATIVE
BUN SERPL-MCNC: 17 MG/DL
CALCIUM SERPL-MCNC: 10 MG/DL
CHLORIDE SERPL-SCNC: 98 MMOL/L
CO2 SERPL-SCNC: 25 MMOL/L
COLOR: YELLOW
CREAT SERPL-MCNC: 1.17 MG/DL
EGFR: 72 ML/MIN/1.73M2
EOSINOPHIL # BLD AUTO: 0.07 K/UL
EOSINOPHIL NFR BLD AUTO: 1.2 %
GLUCOSE QUALITATIVE U: NEGATIVE
GLUCOSE SERPL-MCNC: 94 MG/DL
HCT VFR BLD CALC: 49.7 %
HGB BLD-MCNC: 15.7 G/DL
HYALINE CASTS: 1 /LPF
IMM GRANULOCYTES NFR BLD AUTO: 0.7 %
KETONES URINE: NEGATIVE
LEUKOCYTE ESTERASE URINE: NEGATIVE
LYMPHOCYTES # BLD AUTO: 1.73 K/UL
LYMPHOCYTES NFR BLD AUTO: 29 %
MAN DIFF?: NORMAL
MCHC RBC-ENTMCNC: 30 PG
MCHC RBC-ENTMCNC: 31.6 GM/DL
MCV RBC AUTO: 94.8 FL
MICROSCOPIC-UA: NORMAL
MONOCYTES # BLD AUTO: 0.42 K/UL
MONOCYTES NFR BLD AUTO: 7 %
NEUTROPHILS # BLD AUTO: 3.65 K/UL
NEUTROPHILS NFR BLD AUTO: 61.3 %
NITRITE URINE: NEGATIVE
PH URINE: 5.5
PLATELET # BLD AUTO: 187 K/UL
POTASSIUM SERPL-SCNC: 4.2 MMOL/L
PROT SERPL-MCNC: 8 G/DL
PROTEIN URINE: NEGATIVE
PSA FREE FLD-MCNC: 22 %
PSA FREE SERPL-MCNC: 1.68 NG/ML
PSA SERPL-MCNC: 7.61 NG/ML
RBC # BLD: 5.24 M/UL
RBC # FLD: 13.7 %
RED BLOOD CELLS URINE: 1 /HPF
SODIUM SERPL-SCNC: 138 MMOL/L
SPECIFIC GRAVITY URINE: 1.02
SQUAMOUS EPITHELIAL CELLS: 0 /HPF
TESTOST SERPL-MCNC: 436 NG/DL
UROBILINOGEN URINE: NORMAL
WBC # FLD AUTO: 5.96 K/UL
WHITE BLOOD CELLS URINE: 10 /HPF

## 2022-04-08 NOTE — REVIEW OF SYSTEMS
[Nocturia] : nocturia [Negative] : Heme/Lymph [Dysuria] : no dysuria [Incontinence] : no incontinence [Hesitancy] : no urinary hesitancy

## 2022-04-08 NOTE — HISTORY OF PRESENT ILLNESS
[FreeTextEntry1] : 62yo gentleman with cc of low T. Pt seen primarily for hypogonadism. Pt has been seen in the past by Dr Sherwood. Had been seen by  prior and was told he was hypogonadal. Has ED as well managed with cilais daily. Helps with his BPH sx of urinary frequency improved with meds. Pts T is managed with androgel 1 pumps BID. PSAs have been in the 4-5 range. Has hx of elevated PSA. Had negative prostate biopsy 4/2016. 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 \par \par Pt with PCP dennise with PSA elevation to 5.7 in 2021. Repeat PSA elevated to 7.9, T was normal (low). No change in urinary sx. No recent illness. Pt underwent MRI ([facility]P) that showed 52cc prostate with no suspicious lesions. He underwent bx that was negative. His PSA however continued to rise. Was 10.7. 2mo after bx (4/2021). No change in urinary sx. No dysuria. His other labs look ok. Eventually resolved with PSA down to 5.5 in July 2021 and 5.1 in Sept 2021. \par \par He returns today for follow-up. Most recent PSA now again increased to 7.6. Pt reports he did not abstain from intercourse as with prior. no urinary bother.He has been doing well with Cialis 5 mg and resolved urinary frequency and also helped with his ED.  Creatine back to baseline at 1.17 and testosterone ok at 436 .\par Fluids : 2 glasses of water and milk 1 glass.Voids X 4 times per day and nocturia x  1 .Denies constipation .\par  \par

## 2022-04-08 NOTE — HISTORY OF PRESENT ILLNESS
[FreeTextEntry1] : 62yo gentleman with cc of low T. Pt seen primarily for hypogonadism. Pt has been seen in the past by Dr Sherwood. Had been seen by  prior and was told he was hypogonadal. Has ED as well managed with cilais daily. Helps with his BPH sx of urinary frequency improved with meds. Pts T is managed with androgel 1 pumps BID. PSAs have been in the 4-5 range. Has hx of elevated PSA. Had negative prostate biopsy 4/2016. 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 \par \par Pt with PCP dennise with PSA elevation to 5.7 in 2021. Repeat PSA elevated to 7.9, T was normal (low). No change in urinary sx. No recent illness. Pt underwent MRI (ZP) that showed 52cc prostate with no suspicious lesions. He underwent bx that was negative. His PSA however continued to rise. Was 10.7. 2mo after bx (4/2021). No change in urinary sx. No dysuria. His other labs look ok. Eventually resolved with PSA down to 5.5 in July 2021 and 5.1 in Sept 2021. \par \par He returns today for follow-up. Most recent PSA now again increased to 7.6. Pt reports he did not abstain from intercourse as with prior. no urinary bother.He has been doing well with Cialis 5 mg and resolved urinary frequency and also helped with his ED.  Creatine back to baseline at 1.17 and testosterone ok at 436 .\par Fluids : 2 glasses of water and milk 1 glass.Voids X 4 times per day and nocturia x  1 .Denies constipation .\par  \par

## 2022-04-08 NOTE — ASSESSMENT
[FreeTextEntry1] : PSA elevated. Negative Bx x2 and negative MRI. \par --Repeat PSA in 1mo\par \par Hypogonadism. T is decreased but pt without change in sx and feels well. WOuld also like to avoid increasing dose in setting of rising PSA. \par --COnt AndroGel\par \par BPH\par --Continue Tadalafil 5  mg po daily \par \par RTO in 6 MONTHS \par \par

## 2022-05-16 ENCOUNTER — NON-APPOINTMENT (OUTPATIENT)
Age: 62
End: 2022-05-16

## 2022-05-31 ENCOUNTER — TRANSCRIPTION ENCOUNTER (OUTPATIENT)
Age: 62
End: 2022-05-31

## 2022-06-07 LAB — PSA SERPL-MCNC: 5.09 NG/ML

## 2022-07-01 ENCOUNTER — NON-APPOINTMENT (OUTPATIENT)
Age: 62
End: 2022-07-01

## 2022-08-15 ENCOUNTER — TRANSCRIPTION ENCOUNTER (OUTPATIENT)
Age: 62
End: 2022-08-15

## 2022-08-16 ENCOUNTER — NON-APPOINTMENT (OUTPATIENT)
Age: 62
End: 2022-08-16

## 2022-09-15 ENCOUNTER — TRANSCRIPTION ENCOUNTER (OUTPATIENT)
Age: 62
End: 2022-09-15

## 2022-09-16 ENCOUNTER — TRANSCRIPTION ENCOUNTER (OUTPATIENT)
Age: 62
End: 2022-09-16

## 2022-09-26 ENCOUNTER — NON-APPOINTMENT (OUTPATIENT)
Age: 62
End: 2022-09-26

## 2022-09-28 ENCOUNTER — NON-APPOINTMENT (OUTPATIENT)
Age: 62
End: 2022-09-28

## 2022-09-29 ENCOUNTER — TRANSCRIPTION ENCOUNTER (OUTPATIENT)
Age: 62
End: 2022-09-29

## 2022-10-01 ENCOUNTER — NON-APPOINTMENT (OUTPATIENT)
Age: 62
End: 2022-10-01

## 2022-10-07 ENCOUNTER — APPOINTMENT (OUTPATIENT)
Dept: UROLOGY | Facility: CLINIC | Age: 62
End: 2022-10-07

## 2022-10-07 VITALS
HEART RATE: 88 BPM | SYSTOLIC BLOOD PRESSURE: 132 MMHG | DIASTOLIC BLOOD PRESSURE: 79 MMHG | BODY MASS INDEX: 31.1 KG/M2 | HEIGHT: 69 IN | WEIGHT: 210 LBS | TEMPERATURE: 98.3 F

## 2022-10-07 LAB
ALBUMIN SERPL ELPH-MCNC: 4.8 G/DL
ALP BLD-CCNC: 77 U/L
ALT SERPL-CCNC: 21 U/L
ANION GAP SERPL CALC-SCNC: 13 MMOL/L
AST SERPL-CCNC: 27 U/L
BASOPHILS # BLD AUTO: 0.05 K/UL
BASOPHILS NFR BLD AUTO: 0.5 %
BILIRUB SERPL-MCNC: 0.8 MG/DL
BUN SERPL-MCNC: 21 MG/DL
CALCIUM SERPL-MCNC: 9.7 MG/DL
CHLORIDE SERPL-SCNC: 99 MMOL/L
CO2 SERPL-SCNC: 27 MMOL/L
CREAT SERPL-MCNC: 1.5 MG/DL
EGFR: 53 ML/MIN/1.73M2
EOSINOPHIL # BLD AUTO: 0.09 K/UL
EOSINOPHIL NFR BLD AUTO: 1 %
GLUCOSE SERPL-MCNC: 73 MG/DL
HCT VFR BLD CALC: 48.7 %
HGB BLD-MCNC: 16 G/DL
IMM GRANULOCYTES NFR BLD AUTO: 0.4 %
LYMPHOCYTES # BLD AUTO: 1.06 K/UL
LYMPHOCYTES NFR BLD AUTO: 11.4 %
MAN DIFF?: NORMAL
MCHC RBC-ENTMCNC: 30.4 PG
MCHC RBC-ENTMCNC: 32.9 GM/DL
MCV RBC AUTO: 92.4 FL
MONOCYTES # BLD AUTO: 1.08 K/UL
MONOCYTES NFR BLD AUTO: 11.7 %
NEUTROPHILS # BLD AUTO: 6.94 K/UL
NEUTROPHILS NFR BLD AUTO: 75 %
PLATELET # BLD AUTO: 150 K/UL
POTASSIUM SERPL-SCNC: 5.9 MMOL/L
PROT SERPL-MCNC: 7.7 G/DL
PSA FREE FLD-MCNC: 31 %
PSA FREE SERPL-MCNC: 1.27 NG/ML
PSA SERPL-MCNC: 4.13 NG/ML
RBC # BLD: 5.27 M/UL
RBC # FLD: 13.8 %
SODIUM SERPL-SCNC: 139 MMOL/L
TESTOST FREE SERPL-MCNC: 11.5 PG/ML
TESTOST SERPL-MCNC: 351 NG/DL
WBC # FLD AUTO: 9.26 K/UL

## 2022-10-07 PROCEDURE — 99214 OFFICE O/P EST MOD 30 MIN: CPT

## 2022-10-17 NOTE — ASSESSMENT
[FreeTextEntry1] : PSA elevated. Negative Bx x2 and negative MRI. Now lowest \par --PSA in 1y\par \par Hypogonadism. T is decreased but pt without change in sx and feels well. WOuld also like to avoid increasing dose in setting of rising PSA. \par --COnt AndroGel\par \par BPH\par --Continue Tadalafil 5  mg po daily \par \par RTO in 6 MONTHS for hypogonadism labs\par \par

## 2022-10-17 NOTE — HISTORY OF PRESENT ILLNESS
[FreeTextEntry1] : 63yo gentleman with cc of low T. Pt seen primarily for hypogonadism. Pt has been seen in the past by Dr Sherwood. Had been seen by  prior and was told he was hypogonadal. Has ED as well managed with cilais daily. Helps with his BPH sx of urinary frequency improved with meds. Pts T is managed with androgel 1 pumps BID. PSAs have been in the 4-5 range. Has hx of elevated PSA. Had negative prostate biopsy 4/2016. 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 \par \par Pt with PCP dennise with PSA elevation to 5.7 in 2021. Repeat PSA elevated to 7.9, T was normal (low). No change in urinary sx. No recent illness. Pt underwent MRI (ZP) that showed 52cc prostate with no suspicious lesions. He underwent bx that was negative. His PSA however continued to rise. Was 10.7. 2mo after bx (4/2021). No change in urinary sx. No dysuria. His other labs look ok. Eventually resolved with PSA down to 5.5 in July 2021 and 5.1 in Sept 2021. \par \par He returns today for follow-up. Most improved to 4.13--lowest it has ever been. Pt reports he did not abstain from intercourse as with prior. Now also been on meloxicam for joint issues as well. SLight bump in Cr to 1.5. no urinary bother. He has been doing well with Cialis 5 mg and resolved urinary frequency and also helped with his ED.  \par  \par

## 2022-11-07 ENCOUNTER — TRANSCRIPTION ENCOUNTER (OUTPATIENT)
Age: 62
End: 2022-11-07

## 2023-01-23 ENCOUNTER — TRANSCRIPTION ENCOUNTER (OUTPATIENT)
Age: 63
End: 2023-01-23

## 2023-03-08 ENCOUNTER — TRANSCRIPTION ENCOUNTER (OUTPATIENT)
Age: 63
End: 2023-03-08

## 2023-03-31 ENCOUNTER — APPOINTMENT (OUTPATIENT)
Dept: UROLOGY | Facility: CLINIC | Age: 63
End: 2023-03-31
Payer: COMMERCIAL

## 2023-03-31 DIAGNOSIS — R97.20 ELEVATED PROSTATE, SPECIFIC ANTIGEN [PSA]: ICD-10-CM

## 2023-03-31 LAB
TESTOST FREE SERPL-MCNC: 23.6 PG/ML
TESTOST SERPL-MCNC: 525 NG/DL

## 2023-03-31 PROCEDURE — 99213 OFFICE O/P EST LOW 20 MIN: CPT

## 2023-04-03 NOTE — HISTORY OF PRESENT ILLNESS
[FreeTextEntry1] : 63yo gentleman with cc of low T. Pt seen primarily for hypogonadism. Pt has been seen in the past by Dr Sherwood. Had been seen by  prior and was told he was hypogonadal. Has ED as well managed with cilais daily. Helps with his BPH sx of urinary frequency improved with meds. Pts T is managed with androgel 1 pumps BID. PSAs have been in the 4-5 range. Has hx of elevated PSA. Had negative prostate biopsy 4/2016. 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 Pt with PCP eval with PSA elevation to 5.7 in 2021. Repeat PSA elevated to 7.9, T was normal (low). No change in urinary sx. No recent illness. Pt underwent MRI (ZP) that showed 52cc prostate with no suspicious lesions. He underwent bx that was negative. His PSA however continued to rise. Was 10.7. 2mo after bx (4/2021). No change in urinary sx. No dysuria. His other labs look ok. Eventually resolved with PSA down to 5.5 in July 2021 and 5.1 in Sept 2021. most recent T was 525. \par \par He returns today for follow-up. On 9/2022 improved to 4.13. Was slightly increased Pt reports he did not abstain from intercourse as with prior. Now also been on meloxicam for joint issues as well. SLight bump in Cr to 1.5 in 10/2022. He has been doing well with Cialis 5 mg and resolved urinary frequency and also helped with his ED.  \par  \par Has been having issues with anxiety and started lexapro a couple months ago and has noted sexual side effects with modest decrease in interest and quality of erections but more significant change in time to ejaculation. \par \par Daughter is due to have baby halloween (first grandbaby)\par

## 2023-04-03 NOTE — ASSESSMENT
[FreeTextEntry1] : PSA elevated. Negative Bx x2 and negative MRI. Now lower\par --PSA in 6mo\par \par Hypogonadism. T is good. Having some sx but likely 2/2 new medication. \par --COnt AndroGel\par --Labs in 6mo\par \par BPH\par --Continue Tadalafil 5mg po daily \par \par \par \par

## 2023-04-03 NOTE — PHYSICAL EXAM
[General Appearance - Well Nourished] : well nourished [General Appearance - Well Developed] : well developed [Normal Appearance] : normal appearance [Well Groomed] : well groomed [General Appearance - In No Acute Distress] : no acute distress [Abdomen Soft] : soft [Abdomen Tenderness] : non-tender [Costovertebral Angle Tenderness] : no ~M costovertebral angle tenderness [Edema] : no peripheral edema [] : no respiratory distress [Respiration, Rhythm And Depth] : normal respiratory rhythm and effort [Exaggerated Use Of Accessory Muscles For Inspiration] : no accessory muscle use [Oriented To Time, Place, And Person] : oriented to person, place, and time [Affect] : the affect was normal [Mood] : the mood was normal [Not Anxious] : not anxious [Normal Station and Gait] : the gait and station were normal for the patient's age [No Focal Deficits] : no focal deficits [FreeTextEntry1] : 30cc prostate, smooth, no nodules

## 2023-05-24 ENCOUNTER — TRANSCRIPTION ENCOUNTER (OUTPATIENT)
Age: 63
End: 2023-05-24

## 2023-09-18 ENCOUNTER — TRANSCRIPTION ENCOUNTER (OUTPATIENT)
Age: 63
End: 2023-09-18

## 2023-09-29 ENCOUNTER — APPOINTMENT (OUTPATIENT)
Dept: UROLOGY | Facility: CLINIC | Age: 63
End: 2023-09-29
Payer: COMMERCIAL

## 2023-09-29 VITALS
BODY MASS INDEX: 31.1 KG/M2 | DIASTOLIC BLOOD PRESSURE: 71 MMHG | HEART RATE: 76 BPM | HEIGHT: 69 IN | WEIGHT: 210 LBS | SYSTOLIC BLOOD PRESSURE: 113 MMHG

## 2023-09-29 DIAGNOSIS — N13.8 BENIGN PROSTATIC HYPERPLASIA WITH LOWER URINARY TRACT SYMPMS: ICD-10-CM

## 2023-09-29 DIAGNOSIS — R79.89 OTHER SPECIFIED ABNORMAL FINDINGS OF BLOOD CHEMISTRY: ICD-10-CM

## 2023-09-29 DIAGNOSIS — N40.1 BENIGN PROSTATIC HYPERPLASIA WITH LOWER URINARY TRACT SYMPMS: ICD-10-CM

## 2023-09-29 DIAGNOSIS — N52.9 MALE ERECTILE DYSFUNCTION, UNSPECIFIED: ICD-10-CM

## 2023-09-29 LAB
ALBUMIN SERPL ELPH-MCNC: 4.8 G/DL
ALP BLD-CCNC: 77 U/L
ALT SERPL-CCNC: 34 U/L
ANION GAP SERPL CALC-SCNC: 14 MMOL/L
AST SERPL-CCNC: 30 U/L
BASOPHILS # BLD AUTO: 0.07 K/UL
BASOPHILS NFR BLD AUTO: 1.1 %
BILIRUB SERPL-MCNC: 0.6 MG/DL
BUN SERPL-MCNC: 20 MG/DL
CALCIUM SERPL-MCNC: 9.9 MG/DL
CHLORIDE SERPL-SCNC: 102 MMOL/L
CO2 SERPL-SCNC: 25 MMOL/L
CREAT SERPL-MCNC: 1.3 MG/DL
EGFR: 62 ML/MIN/1.73M2
EOSINOPHIL # BLD AUTO: 0.14 K/UL
EOSINOPHIL NFR BLD AUTO: 2.3 %
GLUCOSE SERPL-MCNC: 103 MG/DL
HCT VFR BLD CALC: 49 %
HGB BLD-MCNC: 16.8 G/DL
IMM GRANULOCYTES NFR BLD AUTO: 0.5 %
LYMPHOCYTES # BLD AUTO: 1.67 K/UL
LYMPHOCYTES NFR BLD AUTO: 27 %
MAN DIFF?: NORMAL
MCHC RBC-ENTMCNC: 31.2 PG
MCHC RBC-ENTMCNC: 34.3 GM/DL
MCV RBC AUTO: 91.1 FL
MONOCYTES # BLD AUTO: 0.56 K/UL
MONOCYTES NFR BLD AUTO: 9 %
NEUTROPHILS # BLD AUTO: 3.72 K/UL
NEUTROPHILS NFR BLD AUTO: 60.1 %
PLATELET # BLD AUTO: 171 K/UL
POTASSIUM SERPL-SCNC: 4.9 MMOL/L
PROT SERPL-MCNC: 7.4 G/DL
PSA FREE FLD-MCNC: 31 %
PSA FREE SERPL-MCNC: 1.46 NG/ML
PSA SERPL-MCNC: 4.64 NG/ML
RBC # BLD: 5.38 M/UL
RBC # FLD: 13.7 %
SODIUM SERPL-SCNC: 140 MMOL/L
TESTOST FREE SERPL-MCNC: 27.7 PG/ML
TESTOST SERPL-MCNC: 390 NG/DL
WBC # FLD AUTO: 6.19 K/UL

## 2023-09-29 PROCEDURE — 99214 OFFICE O/P EST MOD 30 MIN: CPT

## 2023-10-06 PROBLEM — N40.1 BENIGN LOCALIZED HYPERPLASIA OF PROSTATE WITH URINARY OBSTRUCTION: Status: ACTIVE | Noted: 2020-11-06

## 2023-11-01 ENCOUNTER — TRANSCRIPTION ENCOUNTER (OUTPATIENT)
Age: 63
End: 2023-11-01

## 2023-11-01 RX ORDER — TADALAFIL 5 MG/1
5 TABLET ORAL
Qty: 90 | Refills: 3 | Status: ACTIVE | COMMUNITY
Start: 2017-07-28

## 2023-11-02 ENCOUNTER — TRANSCRIPTION ENCOUNTER (OUTPATIENT)
Age: 63
End: 2023-11-02

## 2024-01-16 ENCOUNTER — TRANSCRIPTION ENCOUNTER (OUTPATIENT)
Age: 64
End: 2024-01-16

## 2024-01-24 ENCOUNTER — NON-APPOINTMENT (OUTPATIENT)
Age: 64
End: 2024-01-24

## 2024-01-29 NOTE — ED PROVIDER NOTE - SECONDARY DIAGNOSIS.
Dog bite of left calf, initial encounter No - the patient is unable to be screened due to medical condition

## 2024-03-04 ENCOUNTER — TRANSCRIPTION ENCOUNTER (OUTPATIENT)
Age: 64
End: 2024-03-04

## 2024-03-05 ENCOUNTER — TRANSCRIPTION ENCOUNTER (OUTPATIENT)
Age: 64
End: 2024-03-05

## 2024-03-18 ENCOUNTER — TRANSCRIPTION ENCOUNTER (OUTPATIENT)
Age: 64
End: 2024-03-18

## 2024-05-15 ENCOUNTER — TRANSCRIPTION ENCOUNTER (OUTPATIENT)
Age: 64
End: 2024-05-15

## 2024-05-16 ENCOUNTER — TRANSCRIPTION ENCOUNTER (OUTPATIENT)
Age: 64
End: 2024-05-16

## 2024-05-20 ENCOUNTER — TRANSCRIPTION ENCOUNTER (OUTPATIENT)
Age: 64
End: 2024-05-20

## 2024-05-22 ENCOUNTER — TRANSCRIPTION ENCOUNTER (OUTPATIENT)
Age: 64
End: 2024-05-22

## 2024-08-14 ENCOUNTER — TRANSCRIPTION ENCOUNTER (OUTPATIENT)
Age: 64
End: 2024-08-14

## 2024-09-10 NOTE — PHYSICAL EXAM
[Normal Appearance] : normal appearance [Well Groomed] : well groomed [General Appearance - In No Acute Distress] : no acute distress [Edema] : no peripheral edema [Respiration, Rhythm And Depth] : normal respiratory rhythm and effort [Abdomen Soft] : soft [Exaggerated Use Of Accessory Muscles For Inspiration] : no accessory muscle use [Abdomen Tenderness] : non-tender [Costovertebral Angle Tenderness] : no ~M costovertebral angle tenderness [Urinary Bladder Findings] : the bladder was normal on palpation [Normal Station and Gait] : the gait and station were normal for the patient's age [] : no rash [No Focal Deficits] : no focal deficits [Oriented To Time, Place, And Person] : oriented to person, place, and time [Affect] : the affect was normal [No Palpable Adenopathy] : no palpable adenopathy [Mood] : the mood was normal

## 2024-09-17 ENCOUNTER — APPOINTMENT (OUTPATIENT)
Dept: UROLOGY | Facility: CLINIC | Age: 64
End: 2024-09-17
Payer: COMMERCIAL

## 2024-09-17 VITALS — SYSTOLIC BLOOD PRESSURE: 124 MMHG | DIASTOLIC BLOOD PRESSURE: 76 MMHG

## 2024-09-17 DIAGNOSIS — N52.9 MALE ERECTILE DYSFUNCTION, UNSPECIFIED: ICD-10-CM

## 2024-09-17 DIAGNOSIS — N40.1 BENIGN PROSTATIC HYPERPLASIA WITH LOWER URINARY TRACT SYMPMS: ICD-10-CM

## 2024-09-17 DIAGNOSIS — R97.20 ELEVATED PROSTATE, SPECIFIC ANTIGEN [PSA]: ICD-10-CM

## 2024-09-17 DIAGNOSIS — R79.89 OTHER SPECIFIED ABNORMAL FINDINGS OF BLOOD CHEMISTRY: ICD-10-CM

## 2024-09-17 DIAGNOSIS — N13.8 BENIGN PROSTATIC HYPERPLASIA WITH LOWER URINARY TRACT SYMPMS: ICD-10-CM

## 2024-09-17 PROCEDURE — G2211 COMPLEX E/M VISIT ADD ON: CPT | Mod: NC

## 2024-09-17 PROCEDURE — 99214 OFFICE O/P EST MOD 30 MIN: CPT

## 2024-09-17 NOTE — LETTER BODY
[Dear  ___] : Dear  [unfilled], [Courtesy Letter:] : I had the pleasure of seeing your patient, [unfilled], in my office today. [Please see my note below.] : Please see my note below. [FreeTextEntry2] : Ramesh Lin MD [address] [place] [id number] [FreeTextEntry3] : Sincerely,      Manpreet Bush MD, FACS Director of Urology Services, [facility] Chief of Urology, [facility]  of Urology   [facility] for Urology, [place] [id number] P: [phone] F: [phone] [email or web address]

## 2024-09-17 NOTE — LETTER BODY
[Dear  ___] : Dear  [unfilled], [Courtesy Letter:] : I had the pleasure of seeing your patient, [unfilled], in my office today. [Please see my note below.] : Please see my note below. [FreeTextEntry2] : Ramesh Lin MD [address] [place] [id number] [FreeTextEntry3] : Sincerely,      Manpreet Bush MD, FACS Director of Urology Services, [facility] Chief of Urology, [facility]  of Urology   [facility] for Urology, [place] [id number] P: [phone] F: [phone] [facility]

## 2024-09-17 NOTE — ASSESSMENT
[FreeTextEntry1] : With the recent PSA increase compared to previous although still within the range of where his PSA level has been in the past, I would recommend repeating the PSA again in the short-term.  We also need some additional follow-up blood work to reassess the hematocrit on the testosterone therapy and I will also repeat the comprehensive metabolic panel which showed some mild liver function abnormality.  I have advised him to decrease the use of the testosterone gel to approximately 50% of what he is currently using.  He is currently taking 2 pumps twice daily and I have recommended he take 1 pump twice daily instead.  Blood work will be checked again next month.  At this time he needs to consider prep I do not think he immediately needs to consider prostate reimaging but we may need to consider that depending on the follow-up PSA level.  I have also advised him that we may need to have him undergo additional follow-up for the liver function abnormality if it does not normalize by his next blood test assessment.  He will continue the use of low-dose daily tadalafil to manage both the BPH related urinary symptoms and erectile dysfunction which does seem to be working well for him at this time.

## 2024-09-17 NOTE — LETTER BODY
[Dear  ___] : Dear  [unfilled], [Courtesy Letter:] : I had the pleasure of seeing your patient, [unfilled], in my office today. [Please see my note below.] : Please see my note below. [FreeTextEntry2] : Ramesh Lin MD [address] [place] [id number] [FreeTextEntry3] : Sincerely,      Manpreet Bush MD, FACS Director of Urology Services, [facility] Chief of Urology, [facility]  of Urology   [facility] for Urology, [place] [id number] P: [phone] F: [phone] [email or web address]

## 2024-09-17 NOTE — HISTORY OF PRESENT ILLNESS
[FreeTextEntry1] : Trung Sales returns to the office today.  He is 64 years old and had been previously managed by Joel Franklin and Kaela.  He takes tadalafil for management of erectile dysfunction.  He also notes that the urinary frequency he experiences at baseline is improved with the use of the daily tadalafil.  He has hypogonadism and does use topical AndroGel, 1 pump twice daily. Blood work was performed recently which showed a HCT of 51%, PSA 7.04ng/mL with 25% free fraction, 475ng/dL Testosterone.   PSA has been noted to be mildly elevated in the past.  He has undergone workup with biopsy in 2016 showing all benign prostate tissue.  With a rise of his PSA up to 7.9 he had undergone an MRI of his prostate showing prostate size of 52 cm but no suspicious lesions seen.  Regardless, a repeat prostate biopsy was performed in 2021 that was also negative for malignancy.  Of note, he had also previously been treated with Lexapro with some mild impact on his libido and quality of the erection but he did notice improvement in time to ejaculation.

## 2024-09-30 ENCOUNTER — APPOINTMENT (OUTPATIENT)
Dept: UROLOGY | Facility: CLINIC | Age: 64
End: 2024-09-30

## 2024-10-07 ENCOUNTER — TRANSCRIPTION ENCOUNTER (OUTPATIENT)
Age: 64
End: 2024-10-07

## 2024-10-28 ENCOUNTER — TRANSCRIPTION ENCOUNTER (OUTPATIENT)
Age: 64
End: 2024-10-28

## 2024-11-25 ENCOUNTER — TRANSCRIPTION ENCOUNTER (OUTPATIENT)
Age: 64
End: 2024-11-25

## 2024-12-27 ENCOUNTER — TRANSCRIPTION ENCOUNTER (OUTPATIENT)
Age: 64
End: 2024-12-27

## 2025-03-04 ENCOUNTER — OUTPATIENT (OUTPATIENT)
Dept: OUTPATIENT SERVICES | Facility: HOSPITAL | Age: 65
LOS: 1 days | End: 2025-03-04

## 2025-03-04 ENCOUNTER — APPOINTMENT (OUTPATIENT)
Dept: INTERNAL MEDICINE | Facility: CLINIC | Age: 65
End: 2025-03-04

## 2025-03-14 PROBLEM — E66.9 OBESITY (BMI 30-39.9): Status: ACTIVE | Noted: 2025-03-14

## 2025-03-18 ENCOUNTER — APPOINTMENT (OUTPATIENT)
Dept: SURGERY | Facility: CLINIC | Age: 65
End: 2025-03-18
Payer: COMMERCIAL

## 2025-03-18 VITALS
OXYGEN SATURATION: 96 % | WEIGHT: 220 LBS | BODY MASS INDEX: 32.58 KG/M2 | SYSTOLIC BLOOD PRESSURE: 128 MMHG | HEIGHT: 69 IN | HEART RATE: 76 BPM | RESPIRATION RATE: 17 BRPM | DIASTOLIC BLOOD PRESSURE: 76 MMHG

## 2025-03-18 DIAGNOSIS — K76.0 FATTY (CHANGE OF) LIVER, NOT ELSEWHERE CLASSIFIED: ICD-10-CM

## 2025-03-18 DIAGNOSIS — E66.9 OBESITY, UNSPECIFIED: ICD-10-CM

## 2025-03-18 DIAGNOSIS — G47.33 OBSTRUCTIVE SLEEP APNEA (ADULT) (PEDIATRIC): ICD-10-CM

## 2025-03-18 PROCEDURE — 99204 OFFICE O/P NEW MOD 45 MIN: CPT

## 2025-03-19 RX ORDER — TIRZEPATIDE 2.5 MG/.5ML
2.5 INJECTION, SOLUTION SUBCUTANEOUS
Qty: 4 | Refills: 0 | Status: ACTIVE | COMMUNITY
Start: 2025-03-18

## 2025-03-20 RX ORDER — SEMAGLUTIDE 0.25 MG/.5ML
0.25 INJECTION, SOLUTION SUBCUTANEOUS
Qty: 4 | Refills: 0 | Status: ACTIVE | COMMUNITY
Start: 2025-03-20

## 2025-04-02 ENCOUNTER — APPOINTMENT (OUTPATIENT)
Dept: SURGERY | Facility: CLINIC | Age: 65
End: 2025-04-02
Payer: COMMERCIAL

## 2025-04-02 VITALS
WEIGHT: 225 LBS | DIASTOLIC BLOOD PRESSURE: 72 MMHG | HEART RATE: 76 BPM | OXYGEN SATURATION: 97 % | BODY MASS INDEX: 33.33 KG/M2 | RESPIRATION RATE: 17 BRPM | SYSTOLIC BLOOD PRESSURE: 135 MMHG | HEIGHT: 69 IN

## 2025-04-02 PROCEDURE — 97802 MEDICAL NUTRITION INDIV IN: CPT

## 2025-04-11 RX ORDER — SEMAGLUTIDE 0.5 MG/.5ML
0.5 INJECTION, SOLUTION SUBCUTANEOUS
Qty: 4 | Refills: 0 | Status: ACTIVE | COMMUNITY
Start: 2025-04-11 | End: 1900-01-01

## 2025-05-07 ENCOUNTER — APPOINTMENT (OUTPATIENT)
Dept: SURGERY | Facility: CLINIC | Age: 65
End: 2025-05-07
Payer: COMMERCIAL

## 2025-05-07 VITALS
OXYGEN SATURATION: 96 % | RESPIRATION RATE: 17 BRPM | DIASTOLIC BLOOD PRESSURE: 72 MMHG | HEIGHT: 69 IN | WEIGHT: 209 LBS | BODY MASS INDEX: 30.96 KG/M2 | HEART RATE: 77 BPM | SYSTOLIC BLOOD PRESSURE: 110 MMHG

## 2025-05-07 PROCEDURE — 97803 MED NUTRITION INDIV SUBSEQ: CPT

## 2025-05-12 ENCOUNTER — TRANSCRIPTION ENCOUNTER (OUTPATIENT)
Age: 65
End: 2025-05-12

## 2025-05-15 RX ORDER — SEMAGLUTIDE 1 MG/.5ML
1 INJECTION, SOLUTION SUBCUTANEOUS
Qty: 4 | Refills: 0 | Status: ACTIVE | COMMUNITY
Start: 2025-05-15 | End: 1900-01-01

## 2025-06-03 NOTE — ASSESSMENT
[FreeTextEntry1] : hypogonadism\par --Cont T replacement\par --Cont cialis daily\par --RTC in 6mo with KONSTANTIN and labs\par 
[FreeTextEntry1] : hypogonadism\par --Cont T replacement\par --Cont cialis daily\par --RTC in 6mo with KONSTANTIN and labs\par 
03-Jun-2025 13:46

## 2025-06-04 ENCOUNTER — APPOINTMENT (OUTPATIENT)
Dept: SURGERY | Facility: CLINIC | Age: 65
End: 2025-06-04
Payer: COMMERCIAL

## 2025-06-04 VITALS
HEIGHT: 69 IN | TEMPERATURE: 99 F | HEART RATE: 76 BPM | SYSTOLIC BLOOD PRESSURE: 111 MMHG | DIASTOLIC BLOOD PRESSURE: 68 MMHG | OXYGEN SATURATION: 97 % | WEIGHT: 202 LBS | BODY MASS INDEX: 29.92 KG/M2

## 2025-06-04 PROCEDURE — 97803 MED NUTRITION INDIV SUBSEQ: CPT

## 2025-06-09 RX ORDER — SEMAGLUTIDE 1.7 MG/.75ML
1.7 INJECTION, SOLUTION SUBCUTANEOUS
Qty: 1 | Refills: 2 | Status: ACTIVE | COMMUNITY
Start: 2025-06-09 | End: 1900-01-01

## 2025-07-25 RX ORDER — SEMAGLUTIDE 2.4 MG/.75ML
2.4 INJECTION, SOLUTION SUBCUTANEOUS
Qty: 1 | Refills: 3 | Status: ACTIVE | COMMUNITY
Start: 2025-07-25 | End: 1900-01-01

## 2025-08-06 ENCOUNTER — APPOINTMENT (OUTPATIENT)
Dept: SURGERY | Facility: CLINIC | Age: 65
End: 2025-08-06
Payer: COMMERCIAL

## 2025-08-06 ENCOUNTER — NON-APPOINTMENT (OUTPATIENT)
Age: 65
End: 2025-08-06

## 2025-08-06 VITALS
WEIGHT: 192 LBS | TEMPERATURE: 98.8 F | HEART RATE: 106 BPM | SYSTOLIC BLOOD PRESSURE: 100 MMHG | OXYGEN SATURATION: 97 % | HEIGHT: 69 IN | BODY MASS INDEX: 28.44 KG/M2 | DIASTOLIC BLOOD PRESSURE: 68 MMHG

## 2025-08-06 PROCEDURE — 97803 MED NUTRITION INDIV SUBSEQ: CPT

## 2025-08-25 VITALS — WEIGHT: 185 LBS | BODY MASS INDEX: 27.4 KG/M2 | HEIGHT: 69 IN

## 2025-09-08 ENCOUNTER — TRANSCRIPTION ENCOUNTER (OUTPATIENT)
Age: 65
End: 2025-09-08